# Patient Record
Sex: MALE | Race: WHITE | Employment: PART TIME | URBAN - NONMETROPOLITAN AREA
[De-identification: names, ages, dates, MRNs, and addresses within clinical notes are randomized per-mention and may not be internally consistent; named-entity substitution may affect disease eponyms.]

---

## 2017-08-31 ENCOUNTER — HOSPITAL ENCOUNTER (OUTPATIENT)
Dept: LAB | Age: 52
Discharge: HOME | End: 2017-08-31
Payer: MEDICAID

## 2017-08-31 ENCOUNTER — OFFICE VISIT (OUTPATIENT)
Dept: CARDIOLOGY | Age: 52
End: 2017-08-31
Payer: MEDICAID

## 2017-08-31 VITALS
WEIGHT: 193.6 LBS | DIASTOLIC BLOOD PRESSURE: 64 MMHG | SYSTOLIC BLOOD PRESSURE: 106 MMHG | HEART RATE: 68 BPM | BODY MASS INDEX: 25.66 KG/M2 | HEIGHT: 73 IN

## 2017-08-31 DIAGNOSIS — R42 DIZZINESS: ICD-10-CM

## 2017-08-31 DIAGNOSIS — R42 DIZZINESS: Primary | ICD-10-CM

## 2017-08-31 LAB
ANION GAP SERPL CALCULATED.3IONS-SCNC: 12 MMOL/L (ref 9–17)
BUN BLDV-MCNC: 40 MG/DL (ref 6–20)
BUN/CREAT BLD: 14 (ref 9–20)
CALCIUM SERPL-MCNC: 10.4 MG/DL (ref 8.6–10.4)
CHLORIDE BLD-SCNC: 104 MMOL/L (ref 98–107)
CO2: 25 MMOL/L (ref 20–31)
CREAT SERPL-MCNC: 2.92 MG/DL (ref 0.7–1.2)
GFR AFRICAN AMERICAN: 28 ML/MIN
GFR NON-AFRICAN AMERICAN: 23 ML/MIN
GFR SERPL CREATININE-BSD FRML MDRD: ABNORMAL ML/MIN/{1.73_M2}
GFR SERPL CREATININE-BSD FRML MDRD: ABNORMAL ML/MIN/{1.73_M2}
GLUCOSE BLD-MCNC: 102 MG/DL (ref 70–99)
HCT VFR BLD CALC: 39.5 % (ref 41–53)
HEMOGLOBIN: 12.9 G/DL (ref 13.5–17.5)
MCH RBC QN AUTO: 30 PG (ref 26–34)
MCHC RBC AUTO-ENTMCNC: 32.8 G/DL (ref 31–37)
MCV RBC AUTO: 91.5 FL (ref 80–100)
PDW BLD-RTO: 13.4 % (ref 11–14.5)
PLATELET # BLD: 215 K/UL (ref 140–450)
PMV BLD AUTO: 10.5 FL (ref 6–12)
POTASSIUM SERPL-SCNC: 4.7 MMOL/L (ref 3.7–5.3)
RBC # BLD: 4.32 M/UL (ref 4.5–5.9)
SODIUM BLD-SCNC: 141 MMOL/L (ref 135–144)
TSH SERPL DL<=0.05 MIU/L-ACNC: 3.45 MIU/L (ref 0.3–5)
WBC # BLD: 6.8 K/UL (ref 3.5–11)

## 2017-08-31 PROCEDURE — 99204 OFFICE O/P NEW MOD 45 MIN: CPT | Performed by: INTERNAL MEDICINE

## 2017-08-31 PROCEDURE — 85027 COMPLETE CBC AUTOMATED: CPT

## 2017-08-31 PROCEDURE — 36415 COLL VENOUS BLD VENIPUNCTURE: CPT

## 2017-08-31 PROCEDURE — 80048 BASIC METABOLIC PNL TOTAL CA: CPT

## 2017-08-31 PROCEDURE — 93000 ELECTROCARDIOGRAM COMPLETE: CPT | Performed by: INTERNAL MEDICINE

## 2017-08-31 PROCEDURE — 84443 ASSAY THYROID STIM HORMONE: CPT

## 2017-08-31 RX ORDER — MIDODRINE HYDROCHLORIDE 5 MG/1
5 TABLET ORAL 3 TIMES DAILY
Qty: 90 TABLET | Refills: 3 | Status: SHIPPED | OUTPATIENT
Start: 2017-08-31 | End: 2017-10-13 | Stop reason: SDUPTHER

## 2017-08-31 ASSESSMENT — ENCOUNTER SYMPTOMS
NAUSEA: 0
SHORTNESS OF BREATH: 0
ABDOMINAL PAIN: 0
VOMITING: 0
CHEST TIGHTNESS: 0

## 2017-09-18 ENCOUNTER — TELEPHONE (OUTPATIENT)
Dept: CARDIOLOGY | Age: 52
End: 2017-09-18

## 2017-09-18 ENCOUNTER — HOSPITAL ENCOUNTER (OUTPATIENT)
Dept: NON INVASIVE DIAGNOSTICS | Age: 52
Discharge: HOME | End: 2017-09-18
Payer: MEDICAID

## 2017-09-18 DIAGNOSIS — R42 DIZZINESS: ICD-10-CM

## 2017-09-18 PROCEDURE — 93306 TTE W/DOPPLER COMPLETE: CPT

## 2017-10-13 ENCOUNTER — OFFICE VISIT (OUTPATIENT)
Dept: CARDIOLOGY | Age: 52
End: 2017-10-13
Payer: MEDICAID

## 2017-10-13 VITALS
WEIGHT: 196 LBS | DIASTOLIC BLOOD PRESSURE: 50 MMHG | BODY MASS INDEX: 26.55 KG/M2 | HEART RATE: 96 BPM | HEIGHT: 72 IN | SYSTOLIC BLOOD PRESSURE: 84 MMHG

## 2017-10-13 DIAGNOSIS — N28.9 RENAL DISEASE: ICD-10-CM

## 2017-10-13 DIAGNOSIS — I95.1 ORTHOSTATIC HYPOTENSION: Primary | ICD-10-CM

## 2017-10-13 DIAGNOSIS — R42 DIZZINESS: ICD-10-CM

## 2017-10-13 PROCEDURE — 99213 OFFICE O/P EST LOW 20 MIN: CPT | Performed by: INTERNAL MEDICINE

## 2017-10-13 RX ORDER — MIDODRINE HYDROCHLORIDE 10 MG/1
5 TABLET ORAL 3 TIMES DAILY
Qty: 180 TABLET | Refills: 3 | Status: SHIPPED | OUTPATIENT
Start: 2017-10-13 | End: 2017-11-03 | Stop reason: SDUPTHER

## 2017-10-13 NOTE — PROGRESS NOTES
[address] [address]  Dept: [phone]  Loc: [phone]    Subjective: The patient is a 54y.o. year old, , male is in the office for a follow up visit. Pt denies any dizziness , weakness , his BP on lower side . Patient is doing quit well from cardiac stand point. He gabby any chest pain or discomfort. No orthopnea or PND. Gabby any palpitation, dizziness or syncope. He is completely asymptomatic from cardiac stand point. Past Medical History:   has a past medical history of Hyperthyroidism. Past Surgical History:   has a past surgical history that includes Throat surgery (9/17/1984) and Upper gastrointestinal endoscopy (4-2-15). Home Medications:  Prior to Admission medications    Medication Sig Start Date End Date Taking? Authorizing Provider   midodrine (PROAMATINE) 10 MG tablet Take 0.5 tablets by mouth 3 times daily 10/13/17  Yes Lilian Hardy MD   omeprazole (PRILOSEC) 20 MG capsule Take 1 capsule by mouth daily. 3/13/15  Yes Racquel Kingston MD   acetaminophen-codeine (TYLENOL #3) 300-30 MG per tablet Take 1 tablet by mouth every 6 hours as needed for Pain. Yes Historical Provider, MD   benztropine (COGENTIN) 2 MG tablet Take 2 mg by mouth 3 times daily. Yes Historical Provider, MD   doxepin (SINEQUAN) 25 MG capsule Take 25 mg by mouth nightly. Yes Historical Provider, MD   fenofibrate (TRICOR) 145 MG tablet Take 160 mg by mouth daily    Yes Historical Provider, MD   pravastatin (PRAVACHOL) 40 MG tablet Take 40 mg by mouth daily. Yes Historical Provider, MD   risperiDONE (RISPERDAL) 4 MG tablet Take 4 mg by mouth daily. Yes Historical Provider, MD       Allergies:  Pcn [penicillins]    Social History:   reports that he has never smoked. He has never used smokeless tobacco. He reports that he does not drink alcohol or use drugs. Review of Systems:  · Constitutional: there has been no unanticipated weight loss. There's been No change in energy level, No change in activity level. · Eyes: No visual changes or diplopia. No scleral icterus. · ENT: No Headaches, hearing loss or vertigo. No mouth sores or sore throat. · Cardiovascular: As above. · Respiratory: No SOB, cough or hemoptysis. · Gastrointestinal: No abdominal pain, appetite loss, blood in stools. No change in bowel or bladder habits. · Genitourinary: No dysuria, trouble voiding, or hematuria. · Musculoskeletal:  No gait disturbance, No weakness or joint complaints. · Integumentary: No rash or pruritis. · Psychiatric: No anxiety, or depression. · Hematologic/Lymphatic: No abnormal bruising or bleeding, blood clots or swollen lymph nodes. · Allergic/Immunologic: No nasal congestion or hives. Physical Exam:  BP (!) 84/50   Pulse 96   Ht 6' (1.829 m)   Wt 196 lb (88.9 kg)   BMI 26.58 kg/m²     Constitutional and General Appearance: alert, cooperative, no distress and appears stated age  [de-identified]: PERRL, no cervical lymphadenopathy. No masses palpable. Normal oral mucosa  Respiratory:  · Normal excursion and expansion without use of accessory muscles  · Resp Auscultation: Good respiratory effort. No for increased work of breathing. On auscultation: clear to auscultation bilaterally  Cardiovascular:  · The apical impulse is not displaced  · Heart tones are crisp and normal. regular S1 and S2.  · Jugular venous pulsation Normal  · The carotid upstroke is normal in amplitude and contour without delay or bruit  · Peripheral pulses are symmetrical and full   Abdomen:   · No masses or tenderness  · Bowel sounds present  Extremities:  ·  No Cyanosis or Clubbing  ·  Lower extremity edema: No  ·  Skin: Warm and dry    Cardiac Data:  EKG:     Labs:     CBC: No results for input(s): WBC, HGB, HCT, PLT in the last 72 hours. BMP: No results for input(s): NA, K, CO2, BUN, CREATININE, LABGLOM, GLUCOSE in the last 72 hours.   PT/INR: No results for input(s): PROTIME, INR in the last 72 hours. FASTING LIPID PANEL:No results found for: CHOL, HDL, LDLDIRECT, LDLCHOLESTEROL, TRIG, LABVLDL, CHOLHDLRATIO  LIVER PROFILE:No results for input(s): AST, ALT, LABALBU in the last 72 hours. IMPRESSION:    Hypotension   Renal disease     RECOMMENDATIONS:  HYPOTENSION , STILL LOW ON 5 MG MIDODRINE , NO SYMPTOMS     WILL INCREASE 10 MG TID     PT ADVISED WATCH WELL HYDRATION , AVOID SUDDEN CHANGE IN POSTURE     ECHO WAS NORMAL     DISCUSSED IN DETAILS ABOUT RISK MODIFICATION    RETURN VISIT IN 3 MONTHS, IF ANY SYMPTOM CHANGE PATIENT ADVISED TO GO TO THE EMERGENCY ROOM.           Jorgito Brooke [address] [facility]           [phone]

## 2017-11-03 ENCOUNTER — TELEPHONE (OUTPATIENT)
Dept: PAIN MANAGEMENT | Age: 52
End: 2017-11-03

## 2017-11-03 DIAGNOSIS — R42 DIZZINESS: ICD-10-CM

## 2017-11-03 RX ORDER — MIDODRINE HYDROCHLORIDE 10 MG/1
10 TABLET ORAL 3 TIMES DAILY
Qty: 270 TABLET | Refills: 3 | Status: SHIPPED | OUTPATIENT
Start: 2017-11-03 | End: 2018-10-22 | Stop reason: SDUPTHER

## 2017-11-03 NOTE — TELEPHONE ENCOUNTER
Dr. Usha Quezada,  Dr. Lizett Churchill note from Oct 2017 does say Midodrine 10 mg tid. Please sign new Rx. I spoke to leyda. He is aware that we will be sending a new Rx to [facility].

## 2018-01-12 ENCOUNTER — OFFICE VISIT (OUTPATIENT)
Dept: CARDIOLOGY | Age: 53
End: 2018-01-12
Payer: MEDICAID

## 2018-01-12 VITALS
HEART RATE: 62 BPM | RESPIRATION RATE: 14 BRPM | SYSTOLIC BLOOD PRESSURE: 90 MMHG | DIASTOLIC BLOOD PRESSURE: 70 MMHG | WEIGHT: 194 LBS | BODY MASS INDEX: 26.28 KG/M2 | HEIGHT: 72 IN

## 2018-01-12 DIAGNOSIS — R42 DIZZINESS: Primary | ICD-10-CM

## 2018-01-12 PROCEDURE — 99213 OFFICE O/P EST LOW 20 MIN: CPT | Performed by: INTERNAL MEDICINE

## 2018-01-12 PROCEDURE — 93000 ELECTROCARDIOGRAM COMPLETE: CPT | Performed by: INTERNAL MEDICINE

## 2018-01-12 RX ORDER — MULTIVIT-MIN/IRON/FOLIC ACID/K 18-600-40
1 CAPSULE ORAL DAILY
COMMUNITY

## 2018-01-12 NOTE — PROGRESS NOTES
LIPID PANEL:No results found for: CHOL, HDL, LDLDIRECT, LDLCHOLESTEROL, TRIG, LABVLDL, CHOLHDLRATIO  LIVER PROFILE:No results for input(s): AST, ALT, LABALBU in the last 72 hours. IMPRESSION:    DIZZINESS   HYPOTENSION    RECOMMENDATIONS:  HYPOTENSION AND DIZZINESS , AFTER STARTING MIDODRINE MUCH IMPROVED , NO BLACK OUT OR FALL , SELDOM VERY LIGHT DIZZINESS . ADVISED LOT OF POWER RADE , FLUID , WELL HYDRATION   COMPRESSION STOCKING 20-30 MMHG   ANY CHANGE IN SYMPTOMS GO TO ER   DISCUSSED IN DETAILS ABOUT RISK MODIFICATION    RETURN VISIT IN 5 MONTHS, IF ANY SYMPTOM CHANGE PATIENT ADVISED TO GO TO THE EMERGENCY ROOM.           Jorgito Senior [address] [facility]           [phone]

## 2018-06-01 ENCOUNTER — OFFICE VISIT (OUTPATIENT)
Dept: CARDIOLOGY | Age: 53
End: 2018-06-01
Payer: MEDICAID

## 2018-06-01 VITALS
DIASTOLIC BLOOD PRESSURE: 60 MMHG | HEART RATE: 65 BPM | BODY MASS INDEX: 25.19 KG/M2 | WEIGHT: 186 LBS | HEIGHT: 72 IN | SYSTOLIC BLOOD PRESSURE: 100 MMHG

## 2018-06-01 DIAGNOSIS — I95.1 ORTHOSTATIC HYPOTENSION: ICD-10-CM

## 2018-06-01 DIAGNOSIS — R42 DIZZINESS: Primary | ICD-10-CM

## 2018-06-01 PROCEDURE — 99213 OFFICE O/P EST LOW 20 MIN: CPT | Performed by: INTERNAL MEDICINE

## 2018-06-01 PROCEDURE — 93000 ELECTROCARDIOGRAM COMPLETE: CPT | Performed by: INTERNAL MEDICINE

## 2018-06-01 ASSESSMENT — ENCOUNTER SYMPTOMS
ABDOMINAL DISTENTION: 0
APNEA: 0
ABDOMINAL PAIN: 0
CHEST TIGHTNESS: 0
SHORTNESS OF BREATH: 1

## 2018-09-21 ENCOUNTER — OFFICE VISIT (OUTPATIENT)
Dept: CARDIOLOGY | Age: 53
End: 2018-09-21
Payer: MEDICAID

## 2018-09-21 VITALS
WEIGHT: 190 LBS | HEART RATE: 72 BPM | DIASTOLIC BLOOD PRESSURE: 70 MMHG | BODY MASS INDEX: 25.73 KG/M2 | HEIGHT: 72 IN | SYSTOLIC BLOOD PRESSURE: 110 MMHG

## 2018-09-21 DIAGNOSIS — I95.1 ORTHOSTATIC HYPOTENSION: Primary | ICD-10-CM

## 2018-09-21 PROCEDURE — 99213 OFFICE O/P EST LOW 20 MIN: CPT | Performed by: INTERNAL MEDICINE

## 2018-09-21 RX ORDER — FLUDROCORTISONE ACETATE 0.1 MG/1
1 TABLET ORAL DAILY
COMMUNITY
End: 2020-07-31

## 2018-09-21 NOTE — PROGRESS NOTES
never used smokeless tobacco. He reports that he does not drink alcohol or use drugs. Review of Systems:  · Constitutional: there has been no unanticipated weight loss. There's been No change in energy level, No change in activity level. · Eyes: No visual changes or diplopia. No scleral icterus. · ENT: No Headaches, hearing loss or vertigo. No mouth sores or sore throat. · Cardiovascular: As above. · Respiratory: No SOB, cough or hemoptysis. · Gastrointestinal: No abdominal pain, appetite loss, blood in stools. No change in bowel or bladder habits. · Genitourinary: No dysuria, trouble voiding, or hematuria. · Musculoskeletal:  No gait disturbance, No weakness or joint complaints. · Integumentary: No rash or pruritis. · Psychiatric: No anxiety, or depression. · Hematologic/Lymphatic: No abnormal bruising or bleeding, blood clots or swollen lymph nodes. · Allergic/Immunologic: No nasal congestion or hives. Physical Exam:  /70   Pulse 72   Ht 6' (1.829 m)   Wt 190 lb (86.2 kg)   BMI 25.77 kg/m²     Constitutional and General Appearance: alert, cooperative, no distress and appears stated age  [de-identified]: PERRL, no cervical lymphadenopathy. No masses palpable. Normal oral mucosa  Respiratory:  · Normal excursion and expansion without use of accessory muscles  · Resp Auscultation: Good respiratory effort. No for increased work of breathing.  On auscultation: clear to auscultation bilaterally  Cardiovascular:  · The apical impulse is not displaced  · Heart tones are crisp and normal. regular S1 and S2.  · Jugular venous pulsation Normal  · The carotid upstroke is normal in amplitude and contour without delay or bruit  · Peripheral pulses are symmetrical and full   Abdomen:   · No masses or tenderness  · Bowel sounds present  Extremities:  ·  No Cyanosis or Clubbing  ·  Lower extremity edema: No  ·  Skin: Warm and dry    Cardiac Data:  EKG:     Labs:     CBC: No results for input(s): WBC, HGB, HCT, PLT in the last 72 hours. BMP: No results for input(s): NA, K, CO2, BUN, CREATININE, LABGLOM, GLUCOSE in the last 72 hours. PT/INR: No results for input(s): PROTIME, INR in the last 72 hours. FASTING LIPID PANEL:No results found for: CHOL, HDL, LDLDIRECT, LDLCHOLESTEROL, TRIG, LABVLDL, CHOLHDLRATIO  LIVER PROFILE:No results for input(s): AST, ALT, LABALBU in the last 72 hours. IMPRESSION:    There is no problem list on file for this patient. RECOMMENDATIONS    ORTHOSTATIC HYPOTENSION DOING WELL ON MIDODRINE AND FLORINEF   ADVISE TO WEAR COMPRESSION STOCKING  REGULARLY   RENAL INSUFICENCY  FOLLOWED BY NEPHROLOGY     DISCUSSED IN DETAILS ABOUT RISK MODIFICATION    RETURN VISIT IN 6 MONTHS, IF ANY SYMPTOM CHANGE PATIENT ADVISED TO GO TO THE EMERGENCY ROOM.           Verna Lynn [address] [facility]           [phone]

## 2018-09-21 NOTE — PATIENT INSTRUCTIONS
[employer] Information    Go to [email or web address] or open link in email to log in.     Username: [email or web address]    Password: Arianna Kennedy

## 2018-10-22 DIAGNOSIS — R42 DIZZINESS: ICD-10-CM

## 2018-10-23 RX ORDER — MIDODRINE HYDROCHLORIDE 10 MG/1
10 TABLET ORAL 3 TIMES DAILY
Qty: 270 TABLET | Refills: 3 | Status: SHIPPED | OUTPATIENT
Start: 2018-10-23 | End: 2019-10-29 | Stop reason: SDUPTHER

## 2018-10-24 ENCOUNTER — TELEPHONE (OUTPATIENT)
Dept: CARDIOLOGY | Age: 53
End: 2018-10-24

## 2018-10-24 NOTE — TELEPHONE ENCOUNTER
Would like to know if there is a substitute for florinef. Patient has 3 weeks left of florinef but was told by pharmacy that med was being discontinued.

## 2019-03-15 ENCOUNTER — OFFICE VISIT (OUTPATIENT)
Dept: CARDIOLOGY | Age: 54
End: 2019-03-15
Payer: MEDICAID

## 2019-03-15 VITALS
SYSTOLIC BLOOD PRESSURE: 100 MMHG | DIASTOLIC BLOOD PRESSURE: 70 MMHG | HEART RATE: 60 BPM | WEIGHT: 187 LBS | BODY MASS INDEX: 25.33 KG/M2 | HEIGHT: 72 IN

## 2019-03-15 DIAGNOSIS — R06.02 SOB (SHORTNESS OF BREATH): ICD-10-CM

## 2019-03-15 DIAGNOSIS — I95.1 ORTHOSTATIC HYPOTENSION: ICD-10-CM

## 2019-03-15 DIAGNOSIS — R42 DIZZINESS: Primary | ICD-10-CM

## 2019-03-15 DIAGNOSIS — R07.9 CHEST PAIN, UNSPECIFIED TYPE: ICD-10-CM

## 2019-03-15 PROCEDURE — G8419 CALC BMI OUT NRM PARAM NOF/U: HCPCS | Performed by: INTERNAL MEDICINE

## 2019-03-15 PROCEDURE — 3017F COLORECTAL CA SCREEN DOC REV: CPT | Performed by: INTERNAL MEDICINE

## 2019-03-15 PROCEDURE — G8484 FLU IMMUNIZE NO ADMIN: HCPCS | Performed by: INTERNAL MEDICINE

## 2019-03-15 PROCEDURE — 93000 ELECTROCARDIOGRAM COMPLETE: CPT | Performed by: INTERNAL MEDICINE

## 2019-03-15 PROCEDURE — 1036F TOBACCO NON-USER: CPT | Performed by: INTERNAL MEDICINE

## 2019-03-15 PROCEDURE — G8427 DOCREV CUR MEDS BY ELIG CLIN: HCPCS | Performed by: INTERNAL MEDICINE

## 2019-03-15 PROCEDURE — 99214 OFFICE O/P EST MOD 30 MIN: CPT | Performed by: INTERNAL MEDICINE

## 2019-03-15 ASSESSMENT — ENCOUNTER SYMPTOMS
SHORTNESS OF BREATH: 1
ABDOMINAL PAIN: 0
NAUSEA: 0
CHEST TIGHTNESS: 0
VOMITING: 0

## 2019-06-11 ENCOUNTER — TELEPHONE (OUTPATIENT)
Dept: CARDIOLOGY | Age: 54
End: 2019-06-11

## 2019-06-13 NOTE — TELEPHONE ENCOUNTER
Left message for pt to call to schedule stress. [address] for prior auth for stress test.   Per Jennie Canavan., stress is APPROVED from today 6/13/19 and expires on 7/28/19. Auth # [id number].

## 2019-06-13 NOTE — TELEPHONE ENCOUNTER
Scheduled stress for 7/9/19 at 1:00pm. Notified pt of instructions and date and time and pt states understanding and accepts.

## 2019-07-09 ENCOUNTER — HOSPITAL ENCOUNTER (OUTPATIENT)
Dept: NON INVASIVE DIAGNOSTICS | Age: 54
Discharge: HOME | End: 2019-07-09
Payer: MEDICAID

## 2019-07-09 ENCOUNTER — HOSPITAL ENCOUNTER (OUTPATIENT)
Dept: NUCLEAR MEDICINE | Age: 54
End: 2019-07-09
Payer: MEDICAID

## 2019-07-09 ENCOUNTER — HOSPITAL ENCOUNTER (OUTPATIENT)
Dept: NUCLEAR MEDICINE | Age: 54
Discharge: HOME | End: 2019-07-11
Payer: MEDICAID

## 2019-07-09 DIAGNOSIS — I95.1 ORTHOSTATIC HYPOTENSION: ICD-10-CM

## 2019-07-09 DIAGNOSIS — R06.02 SOB (SHORTNESS OF BREATH): ICD-10-CM

## 2019-07-09 DIAGNOSIS — R42 DIZZINESS: ICD-10-CM

## 2019-07-09 DIAGNOSIS — R07.9 CHEST PAIN, UNSPECIFIED TYPE: ICD-10-CM

## 2019-07-09 PROCEDURE — 93016 CV STRESS TEST SUPVJ ONLY: CPT | Performed by: INTERNAL MEDICINE

## 2019-07-09 PROCEDURE — 93018 CV STRESS TEST I&R ONLY: CPT | Performed by: INTERNAL MEDICINE

## 2019-07-09 PROCEDURE — 93017 CV STRESS TEST TRACING ONLY: CPT

## 2019-07-09 PROCEDURE — 78452 HT MUSCLE IMAGE SPECT MULT: CPT

## 2019-07-09 PROCEDURE — A9500 TC99M SESTAMIBI: HCPCS | Performed by: INTERNAL MEDICINE

## 2019-07-09 PROCEDURE — 6360000002 HC RX W HCPCS: Performed by: INTERNAL MEDICINE

## 2019-07-09 PROCEDURE — 3430000000 HC RX DIAGNOSTIC RADIOPHARMACEUTICAL: Performed by: INTERNAL MEDICINE

## 2019-07-09 RX ADMIN — REGADENOSON 0.4 MG: 0.08 INJECTION, SOLUTION INTRAVENOUS at 14:17

## 2019-07-09 RX ADMIN — TETRAKIS(2-METHOXYISOBUTYLISOCYANIDE)COPPER(I) TETRAFLUOROBORATE 30 MILLICURIE: 1 INJECTION, POWDER, LYOPHILIZED, FOR SOLUTION INTRAVENOUS at 15:28

## 2019-07-09 RX ADMIN — TETRAKIS(2-METHOXYISOBUTYLISOCYANIDE)COPPER(I) TETRAFLUOROBORATE 10 MILLICURIE: 1 INJECTION, POWDER, LYOPHILIZED, FOR SOLUTION INTRAVENOUS at 15:28

## 2019-07-09 NOTE — PROGRESS NOTES
Patient Name:  Nate Perry MRN:  [id number]   :  1965  Age:  48 y.o. Sex: male   Ordering Provider: Cathy Lovell MD  Referring Provider: ELVIN Rios  Primary Care Provider:  ELVIN Rios     Indications: chest pain, dizziness, HTN, and SOB     Medications:     Current Outpatient Medications:     midodrine (PROAMATINE) 10 MG tablet, TAKE 1 TABLET BY MOUTH 3 TIMES DAILY, Disp: 270 tablet, Rfl: 3    fludrocortisone (FLORINEF) 0.1 MG tablet, Take 1 mg by mouth daily , Disp: , Rfl:     Cholecalciferol (VITAMIN D) 2000 units CAPS capsule, Take 1 capsule by mouth daily, Disp: , Rfl:     benztropine (COGENTIN) 2 MG tablet, Take 2 mg by mouth 3 times daily. , Disp: , Rfl:     fenofibrate (TRICOR) 145 MG tablet, Take 145 mg by mouth daily , Disp: , Rfl:     pravastatin (PRAVACHOL) 40 MG tablet, Take 40 mg by mouth daily. , Disp: , Rfl:     risperiDONE (RISPERDAL) 4 MG tablet, Take 4 mg by mouth daily. , Disp: , Rfl:     Current Facility-Administered Medications:     regadenoson (LEXISCAN) injection 0.4 mg, 0.4 mg, Intravenous, Once, Georgi Mead MD      ----------------------------------------------------------------------------------------------------------                Lexiscan 0.4 mg injected over 10 seconds. IV Cardiolite injected 20 seconds post Lexiscan injection.      Heart Rate:  55  Resting Blood Pressure:  119/72   ----------------------------------------------------------------------------------------------------------     HR BP   1 min 99 107/58   2 min     3 min 85 109/56   4 min     5 min 78 114/56   6 min     7 min 78 122/60   8 min     9 min 74 117/59   10 min       Symptoms:  Chest Pain:  No      Nausea:  No     Headache:  No    Shortness of Breath:  Yes     Other:  No       Electronically signed by Lisa Major RCP on 19 at 2:05 PM    ----------------------------------------------------------------------------------------------------------  Resting EKG:  NSR , LVH

## 2019-09-20 ENCOUNTER — OFFICE VISIT (OUTPATIENT)
Dept: CARDIOLOGY | Age: 54
End: 2019-09-20
Payer: MEDICAID

## 2019-09-20 VITALS
HEART RATE: 71 BPM | SYSTOLIC BLOOD PRESSURE: 96 MMHG | WEIGHT: 180.4 LBS | DIASTOLIC BLOOD PRESSURE: 70 MMHG | BODY MASS INDEX: 24.47 KG/M2

## 2019-09-20 DIAGNOSIS — I95.1 ORTHOSTATIC HYPOTENSION: Primary | ICD-10-CM

## 2019-09-20 PROCEDURE — 93000 ELECTROCARDIOGRAM COMPLETE: CPT | Performed by: INTERNAL MEDICINE

## 2019-09-20 PROCEDURE — 99214 OFFICE O/P EST MOD 30 MIN: CPT | Performed by: INTERNAL MEDICINE

## 2019-09-20 PROCEDURE — G8427 DOCREV CUR MEDS BY ELIG CLIN: HCPCS | Performed by: INTERNAL MEDICINE

## 2019-09-20 PROCEDURE — 3017F COLORECTAL CA SCREEN DOC REV: CPT | Performed by: INTERNAL MEDICINE

## 2019-09-20 PROCEDURE — 1036F TOBACCO NON-USER: CPT | Performed by: INTERNAL MEDICINE

## 2019-09-20 PROCEDURE — G8420 CALC BMI NORM PARAMETERS: HCPCS | Performed by: INTERNAL MEDICINE

## 2019-09-20 ASSESSMENT — ENCOUNTER SYMPTOMS
CHEST TIGHTNESS: 0
NAUSEA: 0
BACK PAIN: 0
SHORTNESS OF BREATH: 0
VOMITING: 0
ABDOMINAL PAIN: 0

## 2019-10-29 DIAGNOSIS — R42 DIZZINESS: ICD-10-CM

## 2019-10-29 RX ORDER — MIDODRINE HYDROCHLORIDE 10 MG/1
10 TABLET ORAL 3 TIMES DAILY
Qty: 270 TABLET | Refills: 3 | Status: SHIPPED | OUTPATIENT
Start: 2019-10-29 | End: 2020-11-16

## 2020-07-31 ENCOUNTER — OFFICE VISIT (OUTPATIENT)
Dept: CARDIOLOGY | Age: 55
End: 2020-07-31
Payer: MEDICAID

## 2020-07-31 VITALS
BODY MASS INDEX: 25.87 KG/M2 | WEIGHT: 191 LBS | HEART RATE: 83 BPM | HEIGHT: 72 IN | SYSTOLIC BLOOD PRESSURE: 100 MMHG | DIASTOLIC BLOOD PRESSURE: 62 MMHG

## 2020-07-31 PROCEDURE — 3017F COLORECTAL CA SCREEN DOC REV: CPT | Performed by: INTERNAL MEDICINE

## 2020-07-31 PROCEDURE — 93010 ELECTROCARDIOGRAM REPORT: CPT | Performed by: INTERNAL MEDICINE

## 2020-07-31 PROCEDURE — 99214 OFFICE O/P EST MOD 30 MIN: CPT | Performed by: INTERNAL MEDICINE

## 2020-07-31 PROCEDURE — 1036F TOBACCO NON-USER: CPT | Performed by: INTERNAL MEDICINE

## 2020-07-31 PROCEDURE — G8427 DOCREV CUR MEDS BY ELIG CLIN: HCPCS | Performed by: INTERNAL MEDICINE

## 2020-07-31 PROCEDURE — G8419 CALC BMI OUT NRM PARAM NOF/U: HCPCS | Performed by: INTERNAL MEDICINE

## 2020-07-31 PROCEDURE — 99213 OFFICE O/P EST LOW 20 MIN: CPT

## 2020-07-31 PROCEDURE — 93005 ELECTROCARDIOGRAM TRACING: CPT | Performed by: INTERNAL MEDICINE

## 2020-07-31 NOTE — PROGRESS NOTES
Today's Date: 7/31/2020  Patient Name: Zack Lynn  Patient's age: 48 y. o., 1965          CC: the patient is a 48 y.o.  male is in the office for dizziness. Gets dizzy upon standing but no fall/syncope. Patient has been doing well cardiac wise, no new cardiac complaints. He denies angina, PND/Orthopnea. Past Medical History:   has a past medical history of Hyperthyroidism. Past Surgical History:   has a past surgical history that includes Throat surgery (9/17/1984) and Upper gastrointestinal endoscopy (4-2-15). Home Medications:    Prior to Admission medications    Medication Sig Start Date End Date Taking? Authorizing Provider   midodrine (PROAMATINE) 10 MG tablet Take 1 tablet by mouth 3 times daily 10/29/19  Yes Bindu Gamino MD   Cholecalciferol (VITAMIN D) 2000 units CAPS capsule Take 1 capsule by mouth daily   Yes Historical Provider, MD   benztropine (COGENTIN) 2 MG tablet Take 2 mg by mouth 3 times daily. Yes Historical Provider, MD   fenofibrate (TRICOR) 145 MG tablet Take 145 mg by mouth daily    Yes Historical Provider, MD   pravastatin (PRAVACHOL) 40 MG tablet Take 40 mg by mouth daily. Yes Historical Provider, MD   risperiDONE (RISPERDAL) 4 MG tablet Take 4 mg by mouth daily. Yes Historical Provider, MD       Allergies:  Ibuprofen and Pcn [penicillins]    Social History:   reports that he has never smoked. He has never used smokeless tobacco. He reports that he does not drink alcohol or use drugs. Family History:    Family History   Problem Relation Age of Onset    High Blood Pressure Mother     Diabetes Maternal Aunt     Diabetes Maternal Grandmother     Cancer Maternal Grandfather         lung cancer       REVIEW OF SYSTEMS:  CONSTITUTIONAL:NEGATIVE  HEENT:NEG  Cardiovascular: No chest pain, No dyspnea on exertion, No palpitations.  Lower extremity edema: No  RESPIRATORY: neg  GASTROINTESTINAL:  negative  GENITOURINARY:

## 2020-09-03 ENCOUNTER — OFFICE VISIT (OUTPATIENT)
Dept: PODIATRY | Age: 55
End: 2020-09-03
Payer: MEDICAID

## 2020-09-03 VITALS
DIASTOLIC BLOOD PRESSURE: 68 MMHG | HEIGHT: 72 IN | BODY MASS INDEX: 26.09 KG/M2 | WEIGHT: 192.6 LBS | HEART RATE: 60 BPM | SYSTOLIC BLOOD PRESSURE: 118 MMHG

## 2020-09-03 PROCEDURE — 99203 OFFICE O/P NEW LOW 30 MIN: CPT | Performed by: PODIATRIST

## 2020-09-03 PROCEDURE — 99214 OFFICE O/P EST MOD 30 MIN: CPT

## 2020-09-03 PROCEDURE — G8419 CALC BMI OUT NRM PARAM NOF/U: HCPCS | Performed by: PODIATRIST

## 2020-09-03 PROCEDURE — 3017F COLORECTAL CA SCREEN DOC REV: CPT | Performed by: PODIATRIST

## 2020-09-03 PROCEDURE — 1036F TOBACCO NON-USER: CPT | Performed by: PODIATRIST

## 2020-09-03 PROCEDURE — G8427 DOCREV CUR MEDS BY ELIG CLIN: HCPCS | Performed by: PODIATRIST

## 2020-09-03 RX ORDER — METHYLPREDNISOLONE 4 MG/1
TABLET ORAL
Qty: 1 KIT | Refills: 0 | Status: SHIPPED | OUTPATIENT
Start: 2020-09-03 | End: 2020-09-24 | Stop reason: ALTCHOICE

## 2020-09-03 NOTE — PROGRESS NOTES
Subjective:  Waldo Bravo is a 48 y.o. male who presents to the office today complaining of pain on the Bilateral foot. Symptoms began 1 month(s) ago. Getting worse, off and on for multiple years though. History of injury: no.  Patient relates pain is Present. Pain is rated 4 out of 10 and is described as waxing and waning. Treatments prior to today's visit include: tylenol. Currently denies F/C/N/V. Allergies   Allergen Reactions    Ibuprofen      Kidney disease    Pcn [Penicillins] Rash       Past Medical History:   Diagnosis Date    Hyperthyroidism     Stage 3 chronic kidney disease ([facility] [address]        Prior to Admission medications    Medication Sig Start Date End Date Taking? Authorizing Provider   midodrine (PROAMATINE) 10 MG tablet Take 1 tablet by mouth 3 times daily 10/29/19  Yes Olimpia Reed MD   Cholecalciferol (VITAMIN D) 2000 units CAPS capsule Take 1 capsule by mouth daily   Yes Historical Provider, MD   benztropine (COGENTIN) 2 MG tablet Take 2 mg by mouth 3 times daily. Yes Historical Provider, MD   fenofibrate (TRICOR) 145 MG tablet Take 145 mg by mouth daily    Yes Historical Provider, MD   pravastatin (PRAVACHOL) 40 MG tablet Take 40 mg by mouth daily. Yes Historical Provider, MD   risperiDONE (RISPERDAL) 4 MG tablet Take 4 mg by mouth daily.    Yes Historical Provider, MD       Past Surgical History:   Procedure Laterality Date    COLONOSCOPY      THROAT SURGERY  9/17/1984    Dr. Lisa Falk ENT double set of vocal cords     UPPER GASTROINTESTINAL ENDOSCOPY  4-2-15    mild gastritis, esophagitis       Family History   Problem Relation Age of Onset    High Blood Pressure Mother     Diabetes Maternal Aunt     Diabetes Maternal Grandmother     Cancer Maternal Grandfather         lung cancer       Social History     Tobacco Use    Smoking status: Never Smoker    Smokeless tobacco: Never Used   Substance Use Topics    Alcohol use: No       ROS: All 14 ROS systems reviewed and pertinent positives noted above, all others negative. Lower Extremity Physical Examination:     Vitals:   Vitals:    09/03/20 1025   BP: 118/68   Pulse: 60     General: AAO x 3 in NAD. Vascular: DP and PT pulses palpable 2/4, bilateral.  CFT <3 seconds, bilateral.  Hair growth present to the level of the digits, bilateral.  Edema absent, bilateral.  Varicosities absent, bilateral. Erythema absent, bilateral. Distal Rubor absent bilateral.  Temperature within normal limits bilateral. Hyperpigmentation absent bilateral. No atrophic skin. Neurological: Sensation intact to light touch to level of digits, bilateral.  Protective sensation intact 10/10 sites via 5.07/10g Deshler-Ranjan Monofilament, bilateral.  negative Tinel's, bilateral.  negative Valleix sign, bilateral.  Vibratory intact bilateral.  Reflexes Decreased bilateral.  Paresthesias negative. Dysthesias negative. Sharp/dull intact bilateral.    Musculoskeletal: Muscle strength 5/5, Bilateral.  Pain with strength testing is absent. Pain present upon palpation of distal achilles insertion, Bilateral.  within normal limits medial longitudinal arch, Bilateral.  Ankle ROM decreased,Bilateral.  1st MPJ ROM within normal limits, Bilateral.  Dorsally contracted digits present digits , Bilateral. Other foot deformities haglunds deformity b/l heel. Integument: Warm, dry, supple, bilateral.  Open lesion absent, bilateral.  Interdigital maceration absent to web spaces bilateral.  Nails within normal limits. Fissures absent, bilateral. Hyperkeratotic tissue is absent. Asessment: Patient is a 48 y.o. male with:    Diagnosis Orders   1. Achilles tendinitis of both lower extremities     2. Kelsy's deformity, bilateral     3. Acquired equinus deformity of foot, unspecified laterality         Plan: Patient examined and evaluated. Current condition and treatment options discussed in detail. X rays report reviewed with the pt in detail.   All questions answered. Images would not load so patient will be contacted we can see the actual images. Orders Placed This Encounter   Medications    Elastic Bandages & Supports (ANKLE SPLINT/NIGHT AIRFORM) MISC     Si Device by Does not apply route every evening Achilles tendinitis of both lower extremities  (primary encounter diagnosis)  Kelsy's deformity, bilateral  Equinus deformity    Dispense posterior night splint. Dispense:  1 each     Refill:  0    methylPREDNISolone (MEDROL DOSEPACK) 4 MG tablet     Sig: Take by mouth. Dispense:  1 kit     Refill:  0   Achilles tendonitis Treatment    1. Stretching - 3 times per day minimum for 5-10 minutes. Include stretching against a wall or counter with one foot back and heel flat,    Leaning into a step, or using a towel or belt. Be active and aggressive with your stretching for maximum benefit. 2.  Ice - 3 times per day. 3.  Anti-inflammatory - take OTC or Rx as ordered. 4.  Arch support and supportive shoe gear. Wear orthotics or prefabricated arch supports at ALL times in supportive shoegear. Do NOT go barefoot. Contact office with any questions/problems/concerns. RTC in 3week(s).

## 2020-09-24 ENCOUNTER — OFFICE VISIT (OUTPATIENT)
Dept: PODIATRY | Age: 55
End: 2020-09-24
Payer: MEDICAID

## 2020-09-24 VITALS
HEART RATE: 64 BPM | HEIGHT: 72 IN | BODY MASS INDEX: 25.87 KG/M2 | SYSTOLIC BLOOD PRESSURE: 128 MMHG | DIASTOLIC BLOOD PRESSURE: 74 MMHG | WEIGHT: 191 LBS

## 2020-09-24 PROCEDURE — 99213 OFFICE O/P EST LOW 20 MIN: CPT

## 2020-09-24 PROCEDURE — 1036F TOBACCO NON-USER: CPT | Performed by: PODIATRIST

## 2020-09-24 PROCEDURE — 3017F COLORECTAL CA SCREEN DOC REV: CPT | Performed by: PODIATRIST

## 2020-09-24 PROCEDURE — 99213 OFFICE O/P EST LOW 20 MIN: CPT | Performed by: PODIATRIST

## 2020-09-24 PROCEDURE — G8419 CALC BMI OUT NRM PARAM NOF/U: HCPCS | Performed by: PODIATRIST

## 2020-09-24 PROCEDURE — G8427 DOCREV CUR MEDS BY ELIG CLIN: HCPCS | Performed by: PODIATRIST

## 2020-09-24 NOTE — PROGRESS NOTES
Subjective:  Davian Giordano is a 48 y.o. male who presents to the office today complaining of pain on the back of heels. Symptoms similar. No benefit with Rx med. Patient relates pain is Present. Pain is rated 4 out of 10 and is described as comes and goes. Pt has used splint for few days only. Currently denies F/C/N/V. Allergies   Allergen Reactions    Ibuprofen      Kidney disease    Pcn [Penicillins] Rash       Past Medical History:   Diagnosis Date    Hyperthyroidism     Stage 3 chronic kidney disease ([facility] [address]        Prior to Admission medications    Medication Sig Start Date End Date Taking? Authorizing Provider   diclofenac sodium (VOLTAREN) 1 % GEL Apply 4 g topically 4 times daily 9/24/20 10/24/20 Yes Supa Garcia DPM   Elastic Bandages & Supports (ANKLE SPLINT/NIGHT AIRFORM) MISC 1 Device by Does not apply route every evening Achilles tendinitis of both lower extremities  (primary encounter diagnosis)  Kelsy's deformity, bilateral  Equinus deformity    Dispense posterior night splint. 9/3/20  Yes Supa Garcia DPM   midodrine (PROAMATINE) 10 MG tablet Take 1 tablet by mouth 3 times daily 10/29/19  Yes Enriqueta Mari MD   Cholecalciferol (VITAMIN D) 2000 units CAPS capsule Take 1 capsule by mouth daily   Yes Historical Provider, MD   benztropine (COGENTIN) 2 MG tablet Take 2 mg by mouth 3 times daily. Yes Historical Provider, MD   fenofibrate (TRICOR) 145 MG tablet Take 145 mg by mouth daily    Yes Historical Provider, MD   pravastatin (PRAVACHOL) 40 MG tablet Take 40 mg by mouth daily. Yes Historical Provider, MD   risperiDONE (RISPERDAL) 4 MG tablet Take 4 mg by mouth daily.    Yes Historical Provider, MD       Past Surgical History:   Procedure Laterality Date    COLONOSCOPY      THROAT SURGERY  9/17/1984    Dr. Jaciel Granados ENT double set of vocal cords     UPPER GASTROINTESTINAL ENDOSCOPY  4-2-15    mild gastritis, esophagitis       Family History   Problem Relation Age of Onset    High Blood Pressure Mother     Diabetes Maternal Aunt     Diabetes Maternal Grandmother     Cancer Maternal Grandfather         lung cancer       Social History     Tobacco Use    Smoking status: Never Smoker    Smokeless tobacco: Never Used   Substance Use Topics    Alcohol use: No       ROS: All 14 ROS systems reviewed and pertinent positives noted above, all others negative. Lower Extremity Physical Examination:     Vitals:   Vitals:    09/24/20 1508   BP: 128/74   Pulse: 64     General: AAO x 3 in NAD. Vascular: DP and PT pulses palpable 2/4, bilateral.  CFT <3 seconds, bilateral.  Hair growth present to the level of the digits, bilateral.  Edema absent, bilateral.  Varicosities absent, bilateral. Erythema absent, bilateral. Distal Rubor absent bilateral.  Temperature within normal limits bilateral. Hyperpigmentation absent bilateral. No atrophic skin. Neurological: Sensation intact to light touch to level of digits, bilateral.  Protective sensation intact 10/10 sites via 5.07/10g Fenton-Ranjan Monofilament, bilateral.  negative Tinel's, bilateral.  negative Valleix sign, bilateral.  Vibratory intact bilateral.  Reflexes Decreased bilateral.  Paresthesias negative. Dysthesias negative. Sharp/dull intact bilateral.    Musculoskeletal: Muscle strength 5/5, Bilateral.  Pain with strength testing is absent. Pain present upon palpation of distal achilles insertion, Bilateral.  within normal limits medial longitudinal arch, Bilateral.  Ankle ROM decreased,Bilateral.  1st MPJ ROM within normal limits, Bilateral.  Dorsally contracted digits present digits , Bilateral. Other foot deformities palapble bone deformity b/l posterior heel. Integument: Warm, dry, supple, bilateral.  Open lesion absent, bilateral.  Interdigital maceration absent to web spaces bilateral.  Nails within normal limits. Fissures absent, bilateral. Hyperkeratotic tissue is absent.      Xray: aggressive posterior heel spur, high calcaneal inclination angle    Asessment: Patient is a 48 y.o. male with:    Diagnosis Orders   1. Calcaneal spur of both feet  Amb External Referral To Physical Therapy   2. Achilles tendinitis of both lower extremities  Amb External Referral To Physical Therapy   3. Pes cavus  Amb External Referral To Physical Therapy       Plan: Patient examined and evaluated. Current condition and treatment options discussed in detail. X rays reviewed with the pt in detail. Aggressive spurring noted posterior calcaneus b/l. Orders Placed This Encounter   Medications    diclofenac sodium (VOLTAREN) 1 % GEL     Sig: Apply 4 g topically 4 times daily     Dispense:  5 Tube     Refill:  0   continue conservative care for achilles tendon. Orders Placed This Encounter   Procedures    Amb External Referral To Physical Therapy     Referral Priority:   Routine     Referral Type:   Consult for Advice and Opinion     Referral Reason:   Specialty Services Required     Requested Specialty:   Physical Therapy     Number of Visits Requested:   1   other tx options discussed based on how pt responds to therapy  Contact office with any questions/problems/concerns. RTC in 3 week(s).

## 2020-11-10 ENCOUNTER — TELEPHONE (OUTPATIENT)
Dept: PODIATRY | Age: 55
End: 2020-11-10

## 2020-11-10 NOTE — TELEPHONE ENCOUNTER
Patient was seen on 9/24/2020 and given an order to [facility]. Patient called the office to find out if the office had received prior authorization from insurance for this because someone from [facility] physical therapy advised him to check. [phone].

## 2020-11-16 RX ORDER — MIDODRINE HYDROCHLORIDE 10 MG/1
TABLET ORAL
Qty: 270 TABLET | Refills: 3 | Status: SHIPPED | OUTPATIENT
Start: 2020-11-16 | End: 2021-12-03

## 2020-11-25 ENCOUNTER — TELEPHONE (OUTPATIENT)
Dept: PODIATRY | Age: 55
End: 2020-11-25

## 2020-11-25 NOTE — TELEPHONE ENCOUNTER
Please call Maddie Guidry at [facility] physical therapy. She has questions regarding whether patient needs a prior authorization for physical therapy or not.

## 2021-03-04 ENCOUNTER — OFFICE VISIT (OUTPATIENT)
Dept: PODIATRY | Age: 56
End: 2021-03-04
Payer: MEDICAID

## 2021-03-04 VITALS
DIASTOLIC BLOOD PRESSURE: 68 MMHG | SYSTOLIC BLOOD PRESSURE: 120 MMHG | RESPIRATION RATE: 20 BRPM | BODY MASS INDEX: 27.15 KG/M2 | WEIGHT: 200.2 LBS | HEART RATE: 80 BPM

## 2021-03-04 DIAGNOSIS — M76.61 ACHILLES TENDINITIS OF BOTH LOWER EXTREMITIES: Primary | ICD-10-CM

## 2021-03-04 DIAGNOSIS — M21.6X9 ACQUIRED EQUINUS DEFORMITY OF FOOT, UNSPECIFIED LATERALITY: ICD-10-CM

## 2021-03-04 DIAGNOSIS — M76.62 ACHILLES TENDINITIS OF BOTH LOWER EXTREMITIES: Primary | ICD-10-CM

## 2021-03-04 PROCEDURE — 3017F COLORECTAL CA SCREEN DOC REV: CPT | Performed by: PODIATRIST

## 2021-03-04 PROCEDURE — L4386 NON-PNEUM WALK BOOT PRE CST: HCPCS | Performed by: PODIATRIST

## 2021-03-04 PROCEDURE — G8484 FLU IMMUNIZE NO ADMIN: HCPCS | Performed by: PODIATRIST

## 2021-03-04 PROCEDURE — 1036F TOBACCO NON-USER: CPT | Performed by: PODIATRIST

## 2021-03-04 PROCEDURE — 99213 OFFICE O/P EST LOW 20 MIN: CPT | Performed by: PODIATRIST

## 2021-03-04 PROCEDURE — 99213 OFFICE O/P EST LOW 20 MIN: CPT

## 2021-03-04 PROCEDURE — G8427 DOCREV CUR MEDS BY ELIG CLIN: HCPCS | Performed by: PODIATRIST

## 2021-03-04 PROCEDURE — G8419 CALC BMI OUT NRM PARAM NOF/U: HCPCS | Performed by: PODIATRIST

## 2021-03-04 RX ORDER — TRIAMCINOLONE ACETONIDE 0.25 MG/G
CREAM TOPICAL
COMMUNITY
Start: 2020-12-09 | End: 2022-02-11

## 2021-03-04 NOTE — PROGRESS NOTES
Subjective:  Ferny Birmingham is a 50 y.o. male who presents to the office today complaining of pain on the back of heels. Right side much worse than the left. Patient states overall minimal to no benefit with any treatment she is done to the state. . Patient relates pain is Present. Pain is rated 5 out of 10 and is described as moderate. Currently denies F/C/N/V. Allergies   Allergen Reactions    Ibuprofen      Kidney disease    Pcn [Penicillins] Rash       Past Medical History:   Diagnosis Date    Hyperthyroidism     Stage 3 chronic kidney disease        Prior to Admission medications    Medication Sig Start Date End Date Taking? Authorizing Provider   triamcinolone (KENALOG) 1.495 % cream 1 APPLICATION ONCE A DAY FOR 10 DAYS 12/9/20  Yes Historical Provider, MD   midodrine (PROAMATINE) 10 MG tablet take 1 tablet by mouth three times a day 11/16/20  Yes Antony Cutler DO   Cholecalciferol (VITAMIN D) 2000 units CAPS capsule Take 1 capsule by mouth daily   Yes Historical Provider, MD   benztropine (COGENTIN) 2 MG tablet Take 2 mg by mouth 3 times daily. Yes Historical Provider, MD   fenofibrate (TRICOR) 145 MG tablet Take 145 mg by mouth daily    Yes Historical Provider, MD   pravastatin (PRAVACHOL) 40 MG tablet Take 40 mg by mouth daily. Yes Historical Provider, MD   risperiDONE (RISPERDAL) 4 MG tablet Take 4 mg by mouth daily. Yes Historical Provider, MD   diclofenac sodium (VOLTAREN) 1 % GEL Apply 4 g topically 4 times daily 9/24/20 10/24/20  Andressa Campos DPM   Elastic Bandages & Supports (ANKLE SPLINT/NIGHT AIRFORM) MISC 1 Device by Does not apply route every evening Achilles tendinitis of both lower extremities  (primary encounter diagnosis)  Kelsy's deformity, bilateral  Equinus deformity    Dispense posterior night splint.   Patient not taking: Reported on 3/4/2021 9/3/20   Andressa Campos DPM       Past Surgical History:   Procedure Laterality Date    COLONOSCOPY      THROAT SURGERY 9/17/1984    Dr. Gino Cheek ENT double set of vocal cords     UPPER GASTROINTESTINAL ENDOSCOPY  4-2-15    mild gastritis, esophagitis       Family History   Problem Relation Age of Onset    High Blood Pressure Mother     Diabetes Maternal Aunt     Diabetes Maternal Grandmother     Cancer Maternal Grandfather         lung cancer       Social History     Tobacco Use    Smoking status: Never Smoker    Smokeless tobacco: Never Used   Substance Use Topics    Alcohol use: No       ROS: All 14 ROS systems reviewed and pertinent positives noted above, all others negative. Lower Extremity Physical Examination:     Vitals:   Vitals:    03/04/21 1104   BP: 120/68   Pulse: 80   Resp: 20     General: AAO x 3 in NAD. Vascular: DP and PT pulses palpable 2/4, bilateral.  CFT <3 seconds, bilateral.  Hair growth present to the level of the digits, bilateral.  Edema absent, bilateral.  Varicosities absent, bilateral. Erythema absent, bilateral. Distal Rubor absent bilateral.  Temperature within normal limits bilateral. Hyperpigmentation absent bilateral. No atrophic skin. Neurological: Sensation intact to light touch to level of digits, bilateral.  Protective sensation intact 10/10 sites via 5.07/10g [place]-Ranjan Monofilament, bilateral.  negative Tinel's, bilateral.  negative Valleix sign, bilateral.  Vibratory intact bilateral.  Reflexes Decreased bilateral.  Paresthesias negative. Dysthesias negative. Sharp/dull intact bilateral.    Musculoskeletal: Muscle strength 5/5, Bilateral.  Pain with strength testing is absent. Pain present upon palpation of distal achilles insertion, R.  increased medial longitudinal arch, Bilateral.  Ankle ROM decreased,Bilateral.  1st MPJ ROM within normal limits, Bilateral.  Dorsally contracted digits present digits , Bilateral. Other foot deformities palapble bone deformity b/l posterior heel.     Integument: Warm, dry, supple, bilateral.  Open lesion absent, bilateral. Interdigital maceration absent to web spaces bilateral.  Nails within normal limits. Fissures absent, bilateral. Hyperkeratotic tissue is absent. Xray: aggressive posterior heel spur, high calcaneal inclination angle    Asessment: Patient is a 50 y.o. male with:    Diagnosis Orders   1. Achilles tendinitis of both lower extremities  PROCARE XCELTRAX Walking Boot   2. Acquired equinus deformity of foot, unspecified laterality  PROCARE XCELTRAX Walking Boot       Plan: Patient examined and evaluated. Current condition and treatment options discussed in detail. Old X rays reviewed with the pt in detail. continue conservative care for achilles tendon. Orders Placed This Encounter   Procedures    PROCARE [facility] Walking Boot     Patient was prescribed a [employer]. The right  foot/ankle will require stabilization / immobilization from this semi-rigid / rigid orthosis to improve their function. The orthosis will assist in protecting the affected area, provide functional support and facilitate healing. The patient was educated and fit by a healthcare professional with expert knowledge and specialization in brace application while under the direct supervision of the physician. Verbal and written instructions for the use of and application of this item were provided. They were instructed to contact the office immediately should the brace result in increased pain, decreased sensation, increased swelling or worsening of the condition. Due to level of pain/deformity/condition, it is in my medical opinion that patient will benefit from and is medically necessary for offloading device at this time. Patient was dispensed a cam walker R to wear 100% of the time WB. Patient was educated on appropriate use of the device and the need to be compliant with offloading therapy.   Patient understands that non compliance with the device will be detrimental to the healing of the condition/ulceration. Patient needs the device to help support the foot and reduce pain. This device is medically necessary due to above listed diagnosis. If no benefit with immobilization then pt should see rearfoot surgeon for possible sx intervention. Patient low level medical decision making this visit. Patient is chronic exacerbation of condition. No new testing. We will test reviewed. Low risk of morbidity with current treatment course. Contact office with any questions/problems/concerns. RTC in 3 week(s).

## 2021-03-30 ENCOUNTER — OFFICE VISIT (OUTPATIENT)
Dept: PODIATRY | Age: 56
End: 2021-03-30
Payer: MEDICAID

## 2021-03-30 VITALS
DIASTOLIC BLOOD PRESSURE: 74 MMHG | BODY MASS INDEX: 27.77 KG/M2 | HEART RATE: 82 BPM | HEIGHT: 72 IN | SYSTOLIC BLOOD PRESSURE: 118 MMHG | WEIGHT: 205 LBS

## 2021-03-30 DIAGNOSIS — M21.6X9 ACQUIRED EQUINUS DEFORMITY OF FOOT, UNSPECIFIED LATERALITY: ICD-10-CM

## 2021-03-30 DIAGNOSIS — M77.31 CALCANEAL SPUR OF BOTH FEET: Primary | ICD-10-CM

## 2021-03-30 DIAGNOSIS — M76.61 ACHILLES TENDINITIS OF BOTH LOWER EXTREMITIES: ICD-10-CM

## 2021-03-30 DIAGNOSIS — Q66.70 PES CAVUS: ICD-10-CM

## 2021-03-30 DIAGNOSIS — M76.62 ACHILLES TENDINITIS OF BOTH LOWER EXTREMITIES: ICD-10-CM

## 2021-03-30 DIAGNOSIS — M77.32 CALCANEAL SPUR OF BOTH FEET: Primary | ICD-10-CM

## 2021-03-30 PROCEDURE — 3017F COLORECTAL CA SCREEN DOC REV: CPT | Performed by: PODIATRIST

## 2021-03-30 PROCEDURE — G8419 CALC BMI OUT NRM PARAM NOF/U: HCPCS | Performed by: PODIATRIST

## 2021-03-30 PROCEDURE — 99213 OFFICE O/P EST LOW 20 MIN: CPT | Performed by: PODIATRIST

## 2021-03-30 PROCEDURE — G8427 DOCREV CUR MEDS BY ELIG CLIN: HCPCS | Performed by: PODIATRIST

## 2021-03-30 PROCEDURE — G8484 FLU IMMUNIZE NO ADMIN: HCPCS | Performed by: PODIATRIST

## 2021-03-30 PROCEDURE — 1036F TOBACCO NON-USER: CPT | Performed by: PODIATRIST

## 2021-03-30 NOTE — PROGRESS NOTES
Subjective:  Miguel Duque is a 50 y.o. male who presents to the office today complaining of pain on the back of heels. Pain comes and goes. No relief with use of cam walker. Patient relates pain is Present. Pain is rated 5 out of 10 and is described as moderate. Currently denies F/C/N/V. Allergies   Allergen Reactions    Ibuprofen      Kidney disease    Pcn [Penicillins] Rash       Past Medical History:   Diagnosis Date    Hyperthyroidism     Stage 3 chronic kidney disease        Prior to Admission medications    Medication Sig Start Date End Date Taking? Authorizing Provider   triamcinolone (KENALOG) 4.780 % cream 1 APPLICATION ONCE A DAY FOR 10 DAYS 12/9/20  Yes Historical Provider, MD   midodrine (PROAMATINE) 10 MG tablet take 1 tablet by mouth three times a day 11/16/20  Yes Antony Cutler, DO   Elastic Bandages & Supports (ANKLE SPLINT/NIGHT AIRFORM) MISC 1 Device by Does not apply route every evening Achilles tendinitis of both lower extremities  (primary encounter diagnosis)  Kelsy's deformity, bilateral  Equinus deformity    Dispense posterior night splint. 9/3/20  Yes Rafia Vincent DPM   Cholecalciferol (VITAMIN D) 2000 units CAPS capsule Take 1 capsule by mouth daily   Yes Historical Provider, MD   benztropine (COGENTIN) 2 MG tablet Take 2 mg by mouth 3 times daily. Yes Historical Provider, MD   fenofibrate (TRICOR) 145 MG tablet Take 145 mg by mouth daily    Yes Historical Provider, MD   pravastatin (PRAVACHOL) 40 MG tablet Take 40 mg by mouth daily. Yes Historical Provider, MD   risperiDONE (RISPERDAL) 4 MG tablet Take 4 mg by mouth daily.    Yes Historical Provider, MD   diclofenac sodium (VOLTAREN) 1 % GEL Apply 4 g topically 4 times daily 9/24/20 10/24/20  Rafia Vincent DPM       Past Surgical History:   Procedure Laterality Date    COLONOSCOPY      THROAT SURGERY  9/17/1984    Dr. Janeth Nance ENT double set of vocal cords     UPPER GASTROINTESTINAL ENDOSCOPY  4-2-15    mild gastritis, esophagitis       Family History   Problem Relation Age of Onset    High Blood Pressure Mother     Diabetes Maternal Aunt     Diabetes Maternal Grandmother     Cancer Maternal Grandfather         lung cancer       Social History     Tobacco Use    Smoking status: Never Smoker    Smokeless tobacco: Never Used   Substance Use Topics    Alcohol use: No       ROS: All 14 ROS systems reviewed and pertinent positives noted above, all others negative. Lower Extremity Physical Examination:     Vitals:   Vitals:    03/30/21 1105   BP: 118/74   Pulse: 82     General: AAO x 3 in NAD. Vascular: DP and PT pulses palpable 2/4, bilateral.  CFT <3 seconds, bilateral.  Hair growth present to the level of the digits, bilateral.  Edema absent, bilateral.  Varicosities absent, bilateral. Erythema absent, bilateral. Distal Rubor absent bilateral.  Temperature within normal limits bilateral. Hyperpigmentation absent bilateral. No atrophic skin. Neurological: Sensation intact to light touch to level of digits, bilateral.  Protective sensation intact 10/10 sites via 5.07/10g [place]-Ranjan Monofilament, bilateral.  negative Tinel's, bilateral.  negative Valleix sign, bilateral.  Vibratory intact bilateral.  Reflexes Decreased bilateral.  Paresthesias negative. Dysthesias negative. Sharp/dull intact bilateral.    Musculoskeletal: Muscle strength 5/5, Bilateral.  Pain with strength testing is absent. Pain present upon palpation of distal achilles insertion, R.  increased medial longitudinal arch, Bilateral.  Ankle ROM decreased,Bilateral.  1st MPJ ROM within normal limits, Bilateral.  Dorsally contracted digits present digits , Bilateral. Other foot deformities palapble bone deformity b/l posterior heel. Integument: Warm, dry, supple, bilateral.  Open lesion absent, bilateral.  Interdigital maceration absent to web spaces bilateral.  Nails within normal limits.   Fissures absent, bilateral. Hyperkeratotic tissue is absent. Asessment: Patient is a 50 y.o. male with:    Diagnosis Orders   1. Calcaneal spur of both feet     2. Achilles tendinitis of both lower extremities     3. Acquired equinus deformity of foot, unspecified laterality     pes cavus    Plan: Patient examined and evaluated. Current condition and treatment options discussed in detail. continue conservative care for achilles tendon. Patient will stop CAM Walker. No benefit currently. Orders Placed This Encounter   Procedures    Ambulatory referral to Orthopedic Surgery     Referral Priority:   Routine     Referral Type:   Eval and Treat     Referral Reason:   Specialty Services Required     Referred to Provider:   Lisa Candelaria MD     Requested Specialty:   Orthopedic Surgery     Number of Visits Requested:   1     Will refer to Dr. Reynaldo Jose for possible surgical intervention for the large posterior heel spurs with chronic pain. Patient low level medical decision making this visit. Patient is chronic exacerbation of condition. No new testing. We will test reviewed. Low risk of morbidity with current treatment course. Contact office with any questions/problems/concerns.   RTC in as needed

## 2021-04-15 DIAGNOSIS — M25.571 BILATERAL ANKLE PAIN, UNSPECIFIED CHRONICITY: Primary | ICD-10-CM

## 2021-04-15 DIAGNOSIS — M79.671 BILATERAL FOOT PAIN: ICD-10-CM

## 2021-04-15 DIAGNOSIS — M79.672 BILATERAL FOOT PAIN: ICD-10-CM

## 2021-04-15 DIAGNOSIS — M25.572 BILATERAL ANKLE PAIN, UNSPECIFIED CHRONICITY: Primary | ICD-10-CM

## 2021-04-22 ENCOUNTER — OFFICE VISIT (OUTPATIENT)
Dept: ORTHOPEDIC SURGERY | Age: 56
End: 2021-04-22
Payer: MEDICAID

## 2021-04-22 ENCOUNTER — HOSPITAL ENCOUNTER (OUTPATIENT)
Dept: GENERAL RADIOLOGY | Age: 56
Discharge: HOME | End: 2021-04-24
Payer: MEDICAID

## 2021-04-22 VITALS
WEIGHT: 205 LBS | SYSTOLIC BLOOD PRESSURE: 90 MMHG | BODY MASS INDEX: 27.77 KG/M2 | DIASTOLIC BLOOD PRESSURE: 60 MMHG | HEIGHT: 72 IN | HEART RATE: 101 BPM

## 2021-04-22 DIAGNOSIS — M79.671 BILATERAL FOOT PAIN: ICD-10-CM

## 2021-04-22 DIAGNOSIS — M25.572 BILATERAL ANKLE PAIN, UNSPECIFIED CHRONICITY: ICD-10-CM

## 2021-04-22 DIAGNOSIS — M92.60 HAGLUND'S DEFORMITY: ICD-10-CM

## 2021-04-22 DIAGNOSIS — M76.62 ACHILLES TENDINITIS OF BOTH LOWER EXTREMITIES: Primary | ICD-10-CM

## 2021-04-22 DIAGNOSIS — M76.61 ACHILLES TENDINITIS OF BOTH LOWER EXTREMITIES: Primary | ICD-10-CM

## 2021-04-22 DIAGNOSIS — M25.571 BILATERAL ANKLE PAIN, UNSPECIFIED CHRONICITY: ICD-10-CM

## 2021-04-22 DIAGNOSIS — M79.672 BILATERAL FOOT PAIN: ICD-10-CM

## 2021-04-22 PROCEDURE — 99214 OFFICE O/P EST MOD 30 MIN: CPT | Performed by: ORTHOPAEDIC SURGERY

## 2021-04-22 PROCEDURE — G8427 DOCREV CUR MEDS BY ELIG CLIN: HCPCS | Performed by: ORTHOPAEDIC SURGERY

## 2021-04-22 PROCEDURE — 3017F COLORECTAL CA SCREEN DOC REV: CPT | Performed by: ORTHOPAEDIC SURGERY

## 2021-04-22 PROCEDURE — 73630 X-RAY EXAM OF FOOT: CPT

## 2021-04-22 PROCEDURE — 73610 X-RAY EXAM OF ANKLE: CPT

## 2021-04-22 PROCEDURE — 1036F TOBACCO NON-USER: CPT | Performed by: ORTHOPAEDIC SURGERY

## 2021-04-22 PROCEDURE — 99204 OFFICE O/P NEW MOD 45 MIN: CPT | Performed by: ORTHOPAEDIC SURGERY

## 2021-04-22 PROCEDURE — G8419 CALC BMI OUT NRM PARAM NOF/U: HCPCS | Performed by: ORTHOPAEDIC SURGERY

## 2021-04-22 NOTE — LETTER
Dr. [address] [facility]  [address] [place] [address] [phone]        4/22/2021     Patient: Jim Rouse  YOB: 1965    Dear Eros Langston DPM,    I had the pleasure of seeing one of your patients, Jim Ruose, recently in the office. Below are the relevant portions of my assessment and plan of care. ASSESSMENT AND PLAN:  He has right greater than left insertional Achilles tendinitis with posterior calcaneal enthesophytes and Kelsy's deformity. Notably, he has the past medical history as above. He has a history of paranoid schizophrenia per EMR, as well as stage III chronic kidney disease. We had a discussion today about the likely diagnosis and its natural history, physical exam and imaging findings, as well as various treatment options in detail. Surgically, we discussed a possible future debridement of the Achilles tendon (with possible tendon transfer and/or bony resection as needed), should the patient not respond significantly to conservative management. We discussed the expected postoperative course, including the relevant weightbearing restrictions and immobilization. At this point, the patient is interested in proceeding with surgery in the near future. Orders/referrals were placed as below at today's visit. I also ordered physical therapy for the patient to hep reinforce/teach the relevant weight bearing precautions, to help allow for safe transfers and early mobilization, as well as effectively utilize DME. In order to know exactly how to proceed with treatment (surgical versus nonsurgical, as well as how), an MRI was ordered today to evaluate the Achilles tendon. This is medically necessary to evaluate the structures in this area, for both diagnosis and treatment. All questions were answered and the above plan was agreed upon.  The patient will return to clinic after the MRI has been obtained without x-rays. At his next visit, we will review his MRI, follow-up on his DME eval, and possibly pick a surgical date. Thank you for allowing me to participate in the care of this patient. I look forward to serving you and your patients again in the future. Please don't hesitate to contact me at my mobile number .         Rajat Aden MD  Orthopedic Surgery

## 2021-04-22 NOTE — PROGRESS NOTES
[address] [place] [id number]  Dept: [phone]  Loc: [phone]    Ambulatory Orthopedic Consult      CHIEF COMPLAINT:    Chief Complaint   Patient presents with    Foot Pain     gerson foot pain    Ankle Pain     gerson ankle pain       HISTORY OF PRESENT ILLNESS:      The patient is a 50 y.o. male who is being seen for evaluation of pain at the above location at the posterior heel/Achilles tendon on the right greater than left. The pain is described mainly with mechanical terms (dull/sharp/throbbing). The pain is worse with activity and better with rest. The patient reports a progressive course. The patient has tried:      [x]  rest/activity modification          [x]  NSAIDs      []  opiates      []  orthotics        [x]  change in shoes   [x]  home exercises  [x]  physical therapy      [x]  CAM boot     []  brace:    []  injection:       []  surgery:      The patient is referred here today by Dr. Subha Do. REVIEW OF SYSTEMS:  Constitutional: Negative for fever. HENT: Negative for tinnitus. Eyes: Negative for pain. Respiratory: Negative for shortness of breath. Cardiovascular: Negative for chest pain. Gastrointestinal: Negative for abdominal pain. Genitourinary: Negative for dysuria. Skin: Negative for rash. Neurological: Negative for headaches. Hematological: Does not bruise/bleed easily. Musculoskeletal: See HPI for pertinent positives     Past Medical History:    He  has a past medical history of Hyperthyroidism and Stage 3 chronic kidney disease. Past Surgical History:    He  has a past surgical history that includes Throat surgery (9/17/1984); Upper gastrointestinal endoscopy (4-2-15); and Colonoscopy.      Current Medications:     Current Outpatient Medications:     triamcinolone (KENALOG) 4.473 % cream, 1 APPLICATION ONCE A DAY FOR 10 DAYS, Disp: , Rfl:     midodrine (PROAMATINE) 10 MG tablet, take 1 tablet by mouth three times a day, Disp: 270 tablet, Rfl: 3    Elastic Bandages & Supports (ANKLE SPLINT/NIGHT AIRFORM) MISC, 1 Device by Does not apply route every evening Achilles tendinitis of both lower extremities  (primary encounter diagnosis) Kelsy's deformity, bilateral Equinus deformity  Dispense posterior night splint., Disp: 1 each, Rfl: 0    Cholecalciferol (VITAMIN D) 2000 units CAPS capsule, Take 1 capsule by mouth daily, Disp: , Rfl:     benztropine (COGENTIN) 2 MG tablet, Take 2 mg by mouth 3 times daily. , Disp: , Rfl:     fenofibrate (TRICOR) 145 MG tablet, Take 145 mg by mouth daily , Disp: , Rfl:     pravastatin (PRAVACHOL) 40 MG tablet, Take 40 mg by mouth daily. , Disp: , Rfl:     risperiDONE (RISPERDAL) 4 MG tablet, Take 4 mg by mouth daily. , Disp: , Rfl:     diclofenac sodium (VOLTAREN) 1 % GEL, Apply 4 g topically 4 times daily, Disp: 5 Tube, Rfl: 0     Allergies:    Ibuprofen and Pcn [penicillins]    Family History:  family history includes Cancer in his maternal grandfather; Diabetes in his maternal aunt and maternal grandmother; High Blood Pressure in his mother. 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  No significant swelling of foot.  Skin: Intact without rash/lesions/AV malformations. Strength: Able to fire/perform the following with appropriate strength:    [x]  Tib Ant:     [x]  Gastroc-Soleus:         [x]  Inversion:    [x]  Eversion:         [x]  FHL:     [x]  EHL:      Motion:  Normal for the following joints:    [x]  Ankle:      [x]  Subtalar:        [x]  1st MTP:      []  1st TMT:            Tenderness to Palpation:    Tenderness to palpation: Posterior heel with obvious deformity  -no nodules/thickening of Achilles tendon palpated  -no palpable gap of Achilles tendon noted  -no pain with resisted plantarflexion      LLE:  Vascular: Toes warm and well perfused, compartments soft/compressible. No significant swelling of foot. Skin: Intact without rash/lesions/AV malformations. Strength: Able to fire/perform the following with appropriate strength:    [x]  Tib Ant:     [x]  Gastroc-Soleus:         [x]  Inversion:    [x]  Eversion:         [x]  FHL:     [x]  EHL:      Motion:  Normal for the following joints:    [x]  Ankle:      [x]  Subtalar:        [x]  1st MTP:      []  1st TMT:            Tenderness to Palpation:    Tenderness to palpation: Posterior heel with obvious deformity  -no nodules/thickening of Achilles tendon palpated  -no palpable gap of Achilles tendon noted  -no pain with resisted plantarflexion      RADIOLOGY:   4/22/2021 FINDINGS:  Three weightbearing views (AP, Mortise, and Lateral) of the bilateral ankle and three weightbearing views (AP, Oblique, Lateral) of the bilateral foot were obtained in the office today and reviewed, revealing no acute fracture, dislocation, or radioopaque foreign body/tumor. The ankle mortise is maintained with no widening of the clear spaces. Posterior calcaneal bone spurs at the achilles tendon insertion bilaterally. IMPRESSION:  No acute fracture/dislocation.     Electronically signed by Krysta Shelby MD        Xr Ankle Left (min 3 Views)    Result Date: 4/22/2021  Studies of both ankles and both feet demonstrate no acute abnormality or degenerative change. Bilateral calcaneal enthesopathy at Achilles tendon attachment. Xr Ankle Right (min 3 Views)    Result Date: 4/22/2021  Studies of both ankles and both feet demonstrate no acute abnormality or degenerative change. Bilateral calcaneal enthesopathy at Achilles tendon attachment. Xr Foot Left (min 3 Views)    Result Date: 4/22/2021  Studies of both ankles and both feet demonstrate no acute abnormality or degenerative change. Bilateral calcaneal enthesopathy at Achilles tendon attachment. Xr Foot Right (min 3 Views)    Result Date: 4/22/2021  Studies of both ankles and both feet demonstrate no acute abnormality or degenerative change. Bilateral calcaneal enthesopathy at Achilles tendon attachment. ASSESSMENT AND PLAN:  Body mass index is 27.8 kg/m². He has right greater than left insertional Achilles tendinitis with posterior calcaneal enthesophytes and Kelsy's deformity. Notably, he has the past medical history as above. He has a history of paranoid schizophrenia per EMR, as well as stage III chronic kidney disease. We had a discussion today about the likely diagnosis and its natural history, physical exam and imaging findings, as well as various treatment options in detail. Surgically, we discussed a possible future debridement of the Achilles tendon (with possible tendon transfer and/or bony resection as needed), should the patient not respond significantly to conservative management. We discussed the expected postoperative course, including the relevant weightbearing restrictions and immobilization. At this point, the patient is interested in proceeding with surgery in the near future. Orders/referrals were placed as below at today's visit.      I also ordered physical therapy for the patient to hep reinforce/teach the relevant weight bearing precautions, to help allow for safe transfers and early mobilization, as well as effectively utilize DME. In order to know exactly how to proceed with treatment (surgical versus nonsurgical, as well as how), an MRI was ordered today to evaluate the Achilles tendon. This is medically necessary to evaluate the structures in this area, for both diagnosis and treatment. All questions were answered and the above plan was agreed upon. The patient will return to clinic after the MRI has been obtained without x-rays. At his next visit, we will review his MRI, follow-up on his DME eval, and possibly pick a surgical date. At the patient's next visit, depending on how the patient is doing and/or new imaging/labs results, we may consider the following options:    []  Orthotic (OTC)     []  Orthotic (custom)          []  Rocker bottom shoes     []  Brace (OTC)        []  Brace (custom)             []  CAM boot        []  Night splint         []  Heel cups        []  Strap      []  Toe sleeves/splints    []  PT:                     []  Wean out of immobilization   []  Advance activity       []  Topical               []  NSAIDs          []  Ashley         []  Referral:         []  Stress xrays       []  CT         []  MRI        []  Injection:         []  Consider OR      []  Pick OR date    No follow-ups on file. No orders of the defined types were placed in this encounter.     Orders Placed This Encounter   Procedures    MRI ANKLE RIGHT WO CONTRAST     MUST BE SCHEDULED AT [facility]  MUST BE DONE ON 3T MAGNET OR GREATER  Please have read by [facility] Radiologist     Standing Status:   Future     Standing Expiration Date:   4/22/2022     Order Specific Question:   Reason for exam:     Answer:   eval achilles    Ambulatory referral to Physical Therapy     Referral Priority:   Routine     Referral Type:   Eval and Treat     Referral Reason:   Specialty Services Required     Requested Specialty:   Physical Therapy     Number of Visits Requested:   1         Salome Serum Ashu Menard MD  Orthopedic Surgery        Please excuse any typos/errors, as this note was created with the assistance of voice recognition software. While intending to generate a document that actually reflects the content of the visit, the document can still have some errors including those of syntax and sound-a-like substitutions which may escape proof reading. In such instances, actual meaning can be extrapolated by context.

## 2021-04-22 NOTE — LETTER
Dr. Laura Veloz  [address] [place] [address] [phone]        4/22/2021     Patient: Reece Briggs  YOB: 1965    Dear BABAK Reese CNP,    I had the pleasure of seeing one of your patients, Reece Briggs recently in the office. Below are the relevant portions of my assessment and plan of care. ASSESSMENT AND PLAN:  He has right greater than left insertional Achilles tendinitis with posterior calcaneal enthesophytes and Kelsy's deformity. Notably, he has the past medical history as above. He has a history of paranoid schizophrenia per EMR, as well as stage III chronic kidney disease. We had a discussion today about the likely diagnosis and its natural history, physical exam and imaging findings, as well as various treatment options in detail. Surgically, we discussed a possible future debridement of the Achilles tendon (with possible tendon transfer and/or bony resection as needed), should the patient not respond significantly to conservative management. We discussed the expected postoperative course, including the relevant weightbearing restrictions and immobilization. At this point, the patient is interested in proceeding with surgery in the near future. Orders/referrals were placed as below at today's visit. I also ordered physical therapy for the patient to hep reinforce/teach the relevant weight bearing precautions, to help allow for safe transfers and early mobilization, as well as effectively utilize DME. In order to know exactly how to proceed with treatment (surgical versus nonsurgical, as well as how), an MRI was ordered today to evaluate the Achilles tendon. This is medically necessary to evaluate the structures in this area, for both diagnosis and treatment. All questions were answered and the above plan was agreed upon.  The patient will return to clinic after the

## 2021-04-27 ENCOUNTER — HOSPITAL ENCOUNTER (OUTPATIENT)
Dept: MRI IMAGING | Age: 56
Discharge: HOME | End: 2021-04-29
Payer: MEDICAID

## 2021-04-27 DIAGNOSIS — M76.62 ACHILLES TENDINITIS OF BOTH LOWER EXTREMITIES: ICD-10-CM

## 2021-04-27 DIAGNOSIS — M76.61 ACHILLES TENDINITIS OF BOTH LOWER EXTREMITIES: ICD-10-CM

## 2021-04-27 PROCEDURE — 73721 MRI JNT OF LWR EXTRE W/O DYE: CPT

## 2021-06-24 ENCOUNTER — OFFICE VISIT (OUTPATIENT)
Dept: ORTHOPEDIC SURGERY | Age: 56
End: 2021-06-24
Payer: MEDICAID

## 2021-06-24 VITALS
BODY MASS INDEX: 27.77 KG/M2 | SYSTOLIC BLOOD PRESSURE: 108 MMHG | WEIGHT: 205 LBS | DIASTOLIC BLOOD PRESSURE: 60 MMHG | HEIGHT: 72 IN | HEART RATE: 85 BPM

## 2021-06-24 DIAGNOSIS — M92.60 HAGLUND'S DEFORMITY: ICD-10-CM

## 2021-06-24 DIAGNOSIS — M76.61 ACHILLES TENDINITIS OF BOTH LOWER EXTREMITIES: Primary | ICD-10-CM

## 2021-06-24 DIAGNOSIS — M76.62 ACHILLES TENDINITIS OF BOTH LOWER EXTREMITIES: Primary | ICD-10-CM

## 2021-06-24 PROCEDURE — 1036F TOBACCO NON-USER: CPT | Performed by: ORTHOPAEDIC SURGERY

## 2021-06-24 PROCEDURE — G8419 CALC BMI OUT NRM PARAM NOF/U: HCPCS | Performed by: ORTHOPAEDIC SURGERY

## 2021-06-24 PROCEDURE — 3017F COLORECTAL CA SCREEN DOC REV: CPT | Performed by: ORTHOPAEDIC SURGERY

## 2021-06-24 PROCEDURE — G8427 DOCREV CUR MEDS BY ELIG CLIN: HCPCS | Performed by: ORTHOPAEDIC SURGERY

## 2021-06-24 PROCEDURE — 99214 OFFICE O/P EST MOD 30 MIN: CPT | Performed by: ORTHOPAEDIC SURGERY

## 2021-06-24 PROCEDURE — 99212 OFFICE O/P EST SF 10 MIN: CPT | Performed by: ORTHOPAEDIC SURGERY

## 2021-06-24 NOTE — LETTER
Dr. Vyas Courser  [address] [facility] [phone]        6/24/2021     Patient: Pratibha Briscoe  YOB: 1965    Dear BABAK Gan CNP,    I had the pleasure of seeing one of your patients, Pratibha Briscoe, recently in the office. We have decided to proceed with surgery, and the patient may be reaching out to your office in the near future for medical clearance/optimization. Below are the relevant portions of my assessment and plan of care. ASSESSMENT AND PLAN:  He has right greater than left insertional Achilles tendinitis with posterior calcaneal enthesophytes and Kelsy's deformity. Notably, he has the past medical history as above. He has a history of paranoid schizophrenia per EMR, as well as stage III chronic kidney disease. We had a discussion today about the likely diagnosis and its natural history, physical exam and imaging findings, as well as various treatment options in detail. Surgically, we discussed a right Achilles tendon debridement with Kelsy's excision. We discussed the expected postoperative course, including the relevant weightbearing restrictions and immobilization. Given his past medical history as above (chronic kidney disease), we discussed that the patient is at higher risk for complications, and we discussed the risks of rerupture, wound healing issues, infection, future surgery, as well as incomplete pain relief. At this point, as he does report that his pain is significantly bothering him, he does wish to proceed with surgery in the near future. We did discuss the postoperative course in the setting of his work situation (reports that he works 4-hour shifts 4 days a week), and discussed his anticipated return to work timeframe. Orders/referrals were placed as below at today's visit. At today's visit, the patient was ordered DME as below.  I also ordered

## 2021-06-24 NOTE — PROGRESS NOTES
[facility]  [place] [address] [place] [id number]  Dept: [phone]  Loc: [phone]    Ambulatory Orthopedic Consult      CHIEF COMPLAINT:    Chief Complaint   Patient presents with    Foot Pain     right foot pain, 2 mo follow up with MRI results of right ankle       HISTORY OF PRESENT ILLNESS:      The patient is a 50 y.o. male who is being seen for evaluation of pain at the above location at the posterior heel/Achilles tendon on the right greater than left. The pain is described mainly with mechanical terms (dull/sharp/throbbing). The pain is worse with activity and better with rest. The patient reports a progressive course. The patient has tried:      [x]  rest/activity modification          [x]  NSAIDs      []  opiates      []  orthotics        [x]  change in shoes   [x]  home exercises  [x]  physical therapy      [x]  CAM boot     []  brace:    []  injection:       []  surgery:      The patient is referred here today by Dr. Jude Montana. INTERVAL HISTORY 6/24/2021:  He is seen again today in the office for follow up of imaging as below. Since being seen last, the patient is doing about the same overall. At today's visit, he is using no brace/assistive device. History is obtained today from:   [x]  the patient     [x]  EMR     []  one family member/friend    []  multiple family members/friends    []  other:        REVIEW OF SYSTEMS:  Constitutional: Negative for fever. HENT: Negative for tinnitus. Eyes: Negative for pain. Respiratory: Negative for shortness of breath. Cardiovascular: Negative for chest pain. Gastrointestinal: Negative for abdominal pain. Genitourinary: Negative for dysuria. Skin: Negative for rash. Neurological: Negative for headaches. Hematological: Does not bruise/bleed easily.    Musculoskeletal: See HPI for pertinent positives     Past Medical History:    He  has a past medical history of Hyperthyroidism and Stage 3 chronic kidney disease ([facility] [address] Past Surgical History:    He  has a past surgical history that includes Throat surgery (9/17/1984); Upper gastrointestinal endoscopy (4-2-15); and Colonoscopy. Current Medications:     Current Outpatient Medications:     triamcinolone (KENALOG) 3.466 % cream, 1 APPLICATION ONCE A DAY FOR 10 DAYS, Disp: , Rfl:     midodrine (PROAMATINE) 10 MG tablet, take 1 tablet by mouth three times a day, Disp: 270 tablet, Rfl: 3    Elastic Bandages & Supports (ANKLE SPLINT/NIGHT AIRFORM) MISC, 1 Device by Does not apply route every evening Achilles tendinitis of both lower extremities  (primary encounter diagnosis) Kelsy's deformity, bilateral Equinus deformity  Dispense posterior night splint., Disp: 1 each, Rfl: 0    Cholecalciferol (VITAMIN D) 2000 units CAPS capsule, Take 1 capsule by mouth daily, Disp: , Rfl:     benztropine (COGENTIN) 2 MG tablet, Take 2 mg by mouth 3 times daily. , Disp: , Rfl:     fenofibrate (TRICOR) 145 MG tablet, Take 145 mg by mouth daily , Disp: , Rfl:     pravastatin (PRAVACHOL) 40 MG tablet, Take 40 mg by mouth daily. , Disp: , Rfl:     risperiDONE (RISPERDAL) 4 MG tablet, Take 4 mg by mouth daily. , Disp: , Rfl:     diclofenac sodium (VOLTAREN) 1 % GEL, Apply 4 g topically 4 times daily, Disp: 5 Tube, Rfl: 0     Allergies:    Ibuprofen and Pcn [penicillins]    Family History:  family history includes Cancer in his maternal grandfather; Diabetes in his maternal aunt and maternal grandmother; High Blood Pressure in his mother.     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 No significant swelling of foot. Skin: Intact without rash/lesions/AV malformations. Strength: Able to fire/perform the following with appropriate strength:    [x]  Tib Ant:     [x]  Gastroc-Soleus:         [x]  Inversion:    [x]  Eversion:         [x]  FHL:     [x]  EHL:      Motion:  Normal for the following joints:    [x]  Ankle:      [x]  Subtalar:        [x]  1st MTP:      []  1st TMT:            Tenderness to Palpation:    Tenderness to palpation: Posterior heel with obvious deformity  -no nodules/thickening of Achilles tendon palpated  -no palpable gap of Achilles tendon noted  -no pain with resisted plantarflexion      LLE:  Vascular: Toes warm and well perfused, compartments soft/compressible. No significant swelling of foot. Skin: Intact without rash/lesions/AV malformations. Strength: Able to fire/perform the following with appropriate strength:    [x]  Tib Ant:     [x]  Gastroc-Soleus:         [x]  Inversion:    [x]  Eversion:         [x]  FHL:     [x]  EHL:      Motion:  Normal for the following joints:    [x]  Ankle:      [x]  Subtalar:        [x]  1st MTP:      []  1st TMT:            Tenderness to Palpation:    Tenderness to palpation: Posterior heel with obvious deformity  -no nodules/thickening of Achilles tendon palpated  -no palpable gap of Achilles tendon noted  -no pain with resisted plantarflexion      RADIOLOGY:   6/24/2021 Prior images reviewed for reference.  MRI images and radiology report reviewed, as below:    Mild generalized thickening of the Achilles tendon without superimposed   tendinopathy or tear.     Nonspecific marrow edema within the calcaneus.       No ligament or tendon tear identified.               FINDINGS:  Three weightbearing views (AP, Mortise, and Lateral) of the bilateral ankle and three weightbearing views (AP, Oblique, Lateral) of the bilateral foot were obtained in the office today and reviewed, revealing no acute fracture, dislocation, or radioopaque foreign body/tumor. The ankle mortise is maintained with no widening of the clear spaces. Posterior calcaneal bone spurs at the achilles tendon insertion bilaterally. IMPRESSION:  No acute fracture/dislocation. Electronically signed by Miley Gregg MD        Xr Ankle Left (min 3 Views)    Result Date: 4/22/2021  Studies of both ankles and both feet demonstrate no acute abnormality or degenerative change. Bilateral calcaneal enthesopathy at Achilles tendon attachment. Xr Ankle Right (min 3 Views)    Result Date: 4/22/2021  Studies of both ankles and both feet demonstrate no acute abnormality or degenerative change. Bilateral calcaneal enthesopathy at Achilles tendon attachment. Xr Foot Left (min 3 Views)    Result Date: 4/22/2021  Studies of both ankles and both feet demonstrate no acute abnormality or degenerative change. Bilateral calcaneal enthesopathy at Achilles tendon attachment. Xr Foot Right (min 3 Views)    Result Date: 4/22/2021  Studies of both ankles and both feet demonstrate no acute abnormality or degenerative change. Bilateral calcaneal enthesopathy at Achilles tendon attachment. ASSESSMENT AND PLAN:  Body mass index is 27.8 kg/m². He has right greater than left insertional Achilles tendinitis with posterior calcaneal enthesophytes and Kelsy's deformity. Notably, he has the past medical history as above. He has a history of paranoid schizophrenia per EMR, as well as stage III chronic kidney disease.     We had a discussion today about the likely diagnosis and its natural history, physical exam and imaging findings, as well as various treatment options in detail. Surgically, we discussed a right Achilles tendon debridement with Kelsy's excision. We discussed the expected postoperative course, including the relevant weightbearing restrictions and immobilization. Given his past medical history as above (chronic kidney disease), we discussed that the patient is at higher risk for complications, and we discussed the risks of rerupture, wound healing issues, infection, future surgery, as well as incomplete pain relief. At this point, as he does report that his pain is significantly bothering him, he does wish to proceed with surgery in the near future. We did discuss the postoperative course in the setting of his work situation (reports that he works 4-hour shifts 4 days a week), and discussed his anticipated return to work timeframe. Orders/referrals were placed as below at today's visit. At today's visit, the patient was ordered DME as below. I also ordered physical therapy for the patient to help reinforce/teach the relevant weight bearing precautions, to help allow for safe transfers and early mobilization, as well as effectively utilize DME. Surgical booking paperwork was completed and submitted. All questions were answered and the above plan was agreed upon. The patient will return to clinic in the future for a surgical discussion.          At the patient's next visit, depending on how the patient is doing and/or new imaging/labs results, we may consider the following options:    []  Orthotic (OTC)     []  Orthotic (custom)          []  Rocker bottom shoes     []  Brace (OTC)        []  Brace (custom)             []  CAM boot        []  Night splint         []  Heel cups        []  Strap      []  Toe sleeves/splints    []  PT:                     []  Wean out of immobilization   []  Advance activity       []  Topical               []  NSAIDs          []  Ashley         []  Referral: []  Stress xrays       []  CT         []  MRI        []  Injection:         []  Consider OR      []  Pick OR date    No follow-ups on file. No orders of the defined types were placed in this encounter. No orders of the defined types were placed in this encounter. Beverly Torres MD  Orthopedic Surgery        Please excuse any typos/errors, as this note was created with the assistance of voice recognition software. While intending to generate a document that actually reflects the content of the visit, the document can still have some errors including those of syntax and sound-a-like substitutions which may escape proof reading. In such instances, actual meaning can be extrapolated by context.

## 2021-06-30 DIAGNOSIS — M79.671 RIGHT FOOT PAIN: Primary | ICD-10-CM

## 2021-07-07 ENCOUNTER — TELEPHONE (OUTPATIENT)
Dept: ORTHOPEDIC SURGERY | Age: 56
End: 2021-07-07

## 2021-07-07 NOTE — TELEPHONE ENCOUNTER
Spoke with Charles Ayala at [facility] office about patient's PCP clearance. Patient was seen yesterday, but PCP would like to see patient's labs before clearing him because of his kidney issues. I let her know he will have labs drawn tomorrow afternoon at [facility] then we can fax the results over. I did explain we were seeing the patient back Friday afternoon for surgery discussion. Charles Ayala stated if we can get the labs results to them as soon as they are available, they should be able to send over a clearance in time for the appointment with Dr. Dinah Us. Hieu Cervantes - Please stay on top of this as this is time sensitive.

## 2021-07-07 NOTE — TELEPHONE ENCOUNTER
Patient LM on annamarie GIBSON's VM. Since she ws out today, I checked messages. I called patient back. He wanted to verify dates of when/where he needed to be in the [place] area because he lives over an hour away. Patient states in the e-mail Jack sent him for PAT, the date was for 7/9, but day was Thursday. I did verify for the patient his PAT appointment is Thursday, 7/8/21 at 1p. He also asked if he could do PT near him so he did not have to make trips up to [place]. I told him he is scheduled to see one of our PTs on Friday at 2:30p for a DME eval and that this is just prior to his sx discussion appointment with Dr. Connie De Leon at 3p. I told him this was done probably to save him a trip. He vaguely knew of this information. He then asked when he has to do PT after surgery, if he could go somewhere closer, and I explained yes. When that time comes, he will be given our Achilles protocol and he will have to give it to the PT. The patient's step father was with him and had questions as well. I answered the above questions again (step dad is transportation) and answered post op questions, as well. Patient and is step father were concerned about the length of the trips up here to [place], but I explained to them why they are necessary for the post op treatment. Both seemed to understand. Patient and step dad were told to callback with any other questions.

## 2021-07-08 ENCOUNTER — HOSPITAL ENCOUNTER (OUTPATIENT)
Dept: PREADMISSION TESTING | Age: 56
Discharge: HOME | End: 2021-07-12
Payer: MEDICAID

## 2021-07-08 VITALS
BODY MASS INDEX: 25.29 KG/M2 | SYSTOLIC BLOOD PRESSURE: 119 MMHG | TEMPERATURE: 98 F | HEART RATE: 99 BPM | OXYGEN SATURATION: 97 % | DIASTOLIC BLOOD PRESSURE: 72 MMHG | RESPIRATION RATE: 16 BRPM | HEIGHT: 72 IN | WEIGHT: 186.7 LBS

## 2021-07-08 LAB
ANION GAP SERPL CALCULATED.3IONS-SCNC: 15 MMOL/L (ref 9–17)
BUN BLDV-MCNC: 34 MG/DL (ref 6–20)
BUN/CREAT BLD: 12 (ref 9–20)
CALCIUM SERPL-MCNC: 9.8 MG/DL (ref 8.6–10.4)
CHLORIDE BLD-SCNC: 105 MMOL/L (ref 98–107)
CO2: 20 MMOL/L (ref 20–31)
CREAT SERPL-MCNC: 2.89 MG/DL (ref 0.7–1.2)
GFR AFRICAN AMERICAN: 28 ML/MIN
GFR NON-AFRICAN AMERICAN: 23 ML/MIN
GFR SERPL CREATININE-BSD FRML MDRD: ABNORMAL ML/MIN/{1.73_M2}
GFR SERPL CREATININE-BSD FRML MDRD: ABNORMAL ML/MIN/{1.73_M2}
GLUCOSE BLD-MCNC: 88 MG/DL (ref 70–99)
HCT VFR BLD CALC: 41 % (ref 40.7–50.3)
HEMOGLOBIN: 13.1 G/DL (ref 13–17)
MCH RBC QN AUTO: 30.5 PG (ref 25.2–33.5)
MCHC RBC AUTO-ENTMCNC: 32 G/DL (ref 28.4–34.8)
MCV RBC AUTO: 95.6 FL (ref 82.6–102.9)
NRBC AUTOMATED: 0 PER 100 WBC
PDW BLD-RTO: 13.2 % (ref 11.8–14.4)
PLATELET # BLD: 261 K/UL (ref 138–453)
PMV BLD AUTO: 11.5 FL (ref 8.1–13.5)
POTASSIUM SERPL-SCNC: 4.9 MMOL/L (ref 3.7–5.3)
RBC # BLD: 4.29 M/UL (ref 4.21–5.77)
SODIUM BLD-SCNC: 140 MMOL/L (ref 135–144)
WBC # BLD: 8.6 K/UL (ref 3.5–11.3)

## 2021-07-08 PROCEDURE — 80048 BASIC METABOLIC PNL TOTAL CA: CPT

## 2021-07-08 PROCEDURE — 85027 COMPLETE CBC AUTOMATED: CPT

## 2021-07-08 PROCEDURE — 36415 COLL VENOUS BLD VENIPUNCTURE: CPT

## 2021-07-08 PROCEDURE — 93005 ELECTROCARDIOGRAM TRACING: CPT | Performed by: ORTHOPAEDIC SURGERY

## 2021-07-09 ENCOUNTER — OFFICE VISIT (OUTPATIENT)
Dept: ORTHOPEDIC SURGERY | Age: 56
End: 2021-07-09
Payer: MEDICAID

## 2021-07-09 ENCOUNTER — TELEPHONE (OUTPATIENT)
Dept: ORTHOPEDIC SURGERY | Age: 56
End: 2021-07-09

## 2021-07-09 ENCOUNTER — HOSPITAL ENCOUNTER (OUTPATIENT)
Dept: PHYSICAL THERAPY | Facility: CLINIC | Age: 56
Setting detail: THERAPIES SERIES
Discharge: HOME | End: 2021-07-09
Payer: MEDICAID

## 2021-07-09 VITALS — WEIGHT: 186 LBS | BODY MASS INDEX: 25.19 KG/M2 | RESPIRATION RATE: 14 BRPM | HEIGHT: 72 IN | TEMPERATURE: 98.3 F

## 2021-07-09 DIAGNOSIS — M76.62 ACHILLES TENDINITIS OF BOTH LOWER EXTREMITIES: Primary | ICD-10-CM

## 2021-07-09 DIAGNOSIS — M92.60 HAGLUND'S DEFORMITY: ICD-10-CM

## 2021-07-09 DIAGNOSIS — M76.61 ACHILLES TENDINITIS OF BOTH LOWER EXTREMITIES: Primary | ICD-10-CM

## 2021-07-09 LAB
EKG ATRIAL RATE: 64 BPM
EKG P AXIS: 25 DEGREES
EKG P-R INTERVAL: 146 MS
EKG Q-T INTERVAL: 406 MS
EKG QRS DURATION: 100 MS
EKG QTC CALCULATION (BAZETT): 418 MS
EKG R AXIS: 35 DEGREES
EKG T AXIS: 66 DEGREES
EKG VENTRICULAR RATE: 64 BPM

## 2021-07-09 PROCEDURE — G8419 CALC BMI OUT NRM PARAM NOF/U: HCPCS | Performed by: ORTHOPAEDIC SURGERY

## 2021-07-09 PROCEDURE — 1036F TOBACCO NON-USER: CPT | Performed by: ORTHOPAEDIC SURGERY

## 2021-07-09 PROCEDURE — G8427 DOCREV CUR MEDS BY ELIG CLIN: HCPCS | Performed by: ORTHOPAEDIC SURGERY

## 2021-07-09 PROCEDURE — 99214 OFFICE O/P EST MOD 30 MIN: CPT | Performed by: ORTHOPAEDIC SURGERY

## 2021-07-09 PROCEDURE — 3017F COLORECTAL CA SCREEN DOC REV: CPT | Performed by: ORTHOPAEDIC SURGERY

## 2021-07-09 PROCEDURE — 97161 PT EVAL LOW COMPLEX 20 MIN: CPT

## 2021-07-09 PROCEDURE — 97116 GAIT TRAINING THERAPY: CPT

## 2021-07-09 NOTE — PROGRESS NOTES
[facility]  [facility]  [address] [place] [address]  Dept: [phone]    Ambulatory Orthopedic Consult      CHIEF COMPLAINT:    Chief Complaint   Patient presents with    Foot Pain     R Foot       HISTORY OF PRESENT ILLNESS:      The patient is a 50 y.o. male who is being seen for evaluation of pain at the above location at the posterior heel/Achilles tendon on the right greater than left. The pain is described mainly with mechanical terms (dull/sharp/throbbing). The pain is worse with activity and better with rest. The patient reports a progressive course. The patient has tried:      [x]  rest/activity modification          [x]  NSAIDs      []  opiates      []  orthotics        [x]  change in shoes   [x]  home exercises  [x]  physical therapy      [x]  CAM boot     []  brace:    []  injection:       []  surgery:      The patient is referred here today by Dr. Jyothi Lopez. INTERVAL HISTORY 6/24/2021:  He is seen again today in the office for follow up of imaging as below. Since being seen last, the patient is doing about the same overall. At today's visit, he is using no brace/assistive device. History is obtained today from:   [x]  the patient     [x]  EMR     []  one family member/friend    []  multiple family members/friends    []  other:      INTERVAL HISTORY 7/9/2021:  He is seen again today in the office for follow up of a previous issue (as above). Since being seen last, he reports the problem has not significantly improved. At today's visit, he is using a brace/boot. He is here today to discuss surgery, and reports that he wishes to proceed with surgery in the near future. He denies any other clinically significant changes in history.      History is obtained today from:   [x]  the patient     []  EMR     []  one family member/friend    [x]  multiple family members/friends --patient's mother and stepfather   []  other: REVIEW OF SYSTEMS:  Constitutional: Negative for fever. HENT: Negative for tinnitus. Eyes: Negative for pain. Respiratory: Negative for shortness of breath. Cardiovascular: Negative for chest pain. Gastrointestinal: Negative for abdominal pain. Genitourinary: Negative for dysuria. Skin: Negative for rash. Neurological: Negative for headaches. Hematological: Does not bruise/bleed easily. Musculoskeletal: See HPI for pertinent positives     Past Medical History:    He  has a past medical history of C. difficile diarrhea, Dizziness, Hyperlipidemia, Hyperthyroidism, Hypotension, Orthostatic hypotension, and Stage 3 chronic kidney disease ([facility] [address] Past Surgical History:    He  has a past surgical history that includes Throat surgery (9/17/1984); Upper gastrointestinal endoscopy (4-2-15); and Colonoscopy. Current Medications:     Current Outpatient Medications:     triamcinolone (KENALOG) 1.427 % cream, 1 APPLICATION ONCE A DAY FOR 10 DAYS, Disp: , Rfl:     midodrine (PROAMATINE) 10 MG tablet, take 1 tablet by mouth three times a day, Disp: 270 tablet, Rfl: 3    diclofenac sodium (VOLTAREN) 1 % GEL, Apply 4 g topically 4 times daily, Disp: 5 Tube, Rfl: 0    Elastic Bandages & Supports (ANKLE SPLINT/NIGHT AIRFORM) MISC, 1 Device by Does not apply route every evening Achilles tendinitis of both lower extremities  (primary encounter diagnosis) Kelsy's deformity, bilateral Equinus deformity  Dispense posterior night splint., Disp: 1 each, Rfl: 0    Cholecalciferol (VITAMIN D) 2000 units CAPS capsule, Take 1 capsule by mouth daily, Disp: , Rfl:     benztropine (COGENTIN) 2 MG tablet, Take 2 mg by mouth 3 times daily. , Disp: , Rfl:     fenofibrate (TRICOR) 145 MG tablet, Take 160 mg by mouth daily , Disp: , Rfl:     pravastatin (PRAVACHOL) 40 MG tablet, Take 40 mg by mouth daily. , Disp: , Rfl:     risperiDONE (RISPERDAL) 1 MG tablet, Take 1 mg by mouth daily , Disp: , Rfl: Allergies:    Ibuprofen and Pcn [penicillins]    Family History:  family history includes Cancer in his maternal grandfather; Diabetes in his maternal aunt and maternal grandmother; High Blood Pressure in his mother. Social History:   Social History     Occupational History    Not on file   Tobacco Use    Smoking status: Never Smoker    Smokeless tobacco: Never Used   Vaping Use    Vaping Use: Never used   Substance and Sexual Activity    Alcohol use: No    Drug use: No    Sexual activity: Not on file     Occupation: Works part-time as a [employer]out     OBJECTIVE:  Temp 98.3 °F (36.8 °C)   Resp 14   Ht 6' (1.829 m)   Wt 186 lb (84.4 kg)   BMI 25.23 kg/m²    Psych: alert and oriented to person, time, and place  Cardio:  well perfused extremities  Resp:  normal respiratory effort  Skin:  no cyanosis  Hem/lymph:  no lymphedema  Neuro:  sensation to light touch grossly intact throughout all nerve distributions in the foot   Musculoskeletal:    RLE:  Vascular: Toes warm and well perfused, compartments soft/compressible. No significant swelling of foot. Skin: Intact without rash/lesions/AV malformations. Strength: Able to fire/perform the following with appropriate strength:    [x]  Tib Ant:     [x]  Gastroc-Soleus:         [x]  Inversion:    [x]  Eversion:         [x]  FHL:     [x]  EHL:      Motion:  Normal for the following joints:    [x]  Ankle:      [x]  Subtalar:        [x]  1st MTP:      []  1st TMT:            Tenderness to Palpation:    Tenderness to palpation: Posterior heel with obvious deformity  -no nodules/thickening of Achilles tendon palpated  -no palpable gap of Achilles tendon noted  -no pain with resisted plantarflexion      LLE:  Vascular: Toes warm and well perfused, compartments soft/compressible. No significant swelling of foot. Skin: Intact without rash/lesions/AV malformations.   Strength: Able to fire/perform the following with appropriate strength:    [x]  Tib Ant:     [x] attachment. Xr Foot Right (min 3 Views)    Result Date: 4/22/2021  Studies of both ankles and both feet demonstrate no acute abnormality or degenerative change. Bilateral calcaneal enthesopathy at Achilles tendon attachment. ASSESSMENT AND PLAN:  Body mass index is 25.23 kg/m². He has right greater than left insertional Achilles tendinitis with posterior calcaneal enthesophytes and Kelsy's deformity. Notably, he has the past medical history as above. He has a history of paranoid schizophrenia per EMR, as well as stage III chronic kidney disease. He has previously seen a cardiologist.    We had a discussion today about the likely diagnosis and its natural history, physical exam and imaging findings, as well as various treatment options in detail. Surgically, we had a long discussion today about surgical and nonsurgical treatment options. In terms of surgery, we discussed the right Achilles tendon debridement with a Kelsy's excision. We again discussed the anticipated postoperative course and relevant weightbearing restrictions/immobilization. We did discuss his elevated risk of postoperative complications, given his past medical history as above. We did discuss the risks of wound healing issues, rerupture, infection, future surgeries, incomplete pain relief, and even limb loss. We discussed the importance of strictly adhering to the weightbearing restriction postoperatively. We did also discuss the actual impact that his pain is having on his life, and at this time, it does not appear that his pain is significantly affecting him, but rather intermittently will bother him. After a long thorough discussion, the patient and his family talked amongst themselves, and wished to proceed with nonoperative management for the time being. I do believe that this is in the patient's best interest, as he does not seem to be functionally limited by his pain at this time.     At today's visit, the patient's physical therapy and medical clearances were also reviewed, and he was deemed moderate risk, and the patient was also cleared by nephrology, and we discussed this. Orders/referrals were placed as below at today's visit. All questions were answered and the patient agrees with the above plan. The patient will return to clinic in the future as needed. At the patient's next visit, depending on how the patient is doing and/or new imaging/labs results, we may consider the following options:    []  Orthotic (OTC)     []  Orthotic (custom)          []  Rocker bottom shoes     []  Brace (OTC)        []  Brace (custom)             []  CAM boot        []  Night splint         []  Heel cups        []  Strap      []  Toe sleeves/splints    []  PT:                     []  Wean out of immobilization   []  Advance activity       []  Topical               []  NSAIDs          []  Ashley         []  Referral:         []  Stress xrays       []  CT         []  MRI        []  Injection:         []  Consider OR      []  Pick OR date    No follow-ups on file. No orders of the defined types were placed in this encounter. No orders of the defined types were placed in this encounter. Taryn Buckner MD  Orthopedic Surgery        Please excuse any typos/errors, as this note was created with the assistance of voice recognition software. While intending to generate a document that actually reflects the content of the visit, the document can still have some errors including those of syntax and sound-a-like substitutions which may escape proof reading. In such instances, actual meaning can be extrapolated by context.

## 2021-07-09 NOTE — CONSULTS
[] [facility]) - [facility] &  Therapy  [address]    [phone]  F: [phone]   [] [address]  [address]   [address]  P: [phone]  F: [phone]  [] [place]  [address]  P: [phone]  F: [phone]   [] [address]  P: [phone]  F: [phone]  [] [facility]  [address]  [place]: [phone]   F: [phone] [x] [facility]  [facility]rapy  [address]   [place]: [phone]  F: [phone]           Physical Therapy Evaluation    Date:  2021   Patient: Robyn Bella  : 1965  MRN: [id number]  Physician: Dr. Rachel Kasper: Julian Gomez Diagnosis: R foot pain   Rehab Codes: M79.671  Onset date: 21   Next Dr's appt.: surgery scheduled 7/15/21    Subjective:   CC/HPI: 51 y/o male presents for a DME evaluation. Patient has surgery scheduled for 7/15/21 right Achilles tendon debridement with Kelsy's excision. Patient denies acute injury. Patient lives with his parents in a 1 story home with 2 steps to get in. PMHx:     Past Medical History in prose (no negatives)[]Expand by Default    has a past medical history of Hyperthyroidism and Stage 3 chronic kidney disease ([facility] [address]    Past Surgical History:    He  has a past surgical history that includes Throat surgery (1984); Upper gastrointestinal endoscopy (4-2-15); and Colonoscopy. Tests: [] X-Ray:    [x] MRI:     2021 Prior images reviewed for reference.  MRI images and radiology report reviewed, as below:    Mild generalized thickening of the Achilles tendon without superimposed   tendinopathy or tear.       Nonspecific marrow edema within the calcaneus.       No ligament or tendon tear identified. [] Other:     Medications:  [x] Refer to full medical record [] None [] Other:  Allergies:       [x] Refer to full medical record [] None [] Other:      Martial Status Mom and stepdad   Home type No handrails on front porch    Stairs from outside 2 steps no handrails   Stairs inside None   Employement [employer] at [address] status On vacation this week, will be written off work after surgery    Work Activities/duties  Prolonged standing and walking        Pain present? No    Location R ankle    Pain Rating currently 0/10   Pain at worse 4/10 pain when stepping down barefoot   Pain at best    Description of pain    Altered Sensation Numbness up to his waist    What makes it worse Standing, stepping down    What makes it better    Pain altered treatment/action No          Objective:    ROM: WFL              Strength: WFL         Edema: No           Palpation/Pain: Patient with mild R ankle pain          Educated pt on use of DME, including: (Check box of device used)     [x] Knee Scooter: Instructed pt on appropriate height being even with contralateral knee. Reviewed safety concerns such as not gliding on turns and using breaks appropriately. [x] Rolling Walker: Instructed pt on appropriate height of even with wrists with arms at resting at side. Educated pt on safe gait pattern while using walker. Explained to pt the difference between a 4 wheeled walker and rolling walker and how a rolling walker is most appropriate for a NWB pt. [x] Axillary Crutches: Instructed pt on appropriate height of two finger width between crutch and axilla, as well as elbow flexion of approximately 20deg. Educated pt on how to adjust both crutch and handle height. [x] Stairs: Patient was educated on use of bilateral crutches to navigate his 2 steps to get inside his home.  Additionally, demonstration of backwards scooting was provided with patient able to demonstrate this while maintaining NWB on his RLE. Pt with good understanding of all techniques/uses and expressed no safety concerns. At this time pt will most benefit from use of: rolling walker for general mobility, crutches for stair navigation, and knee scooter for home mobility. Comments: Patient was able to walk with the rolling walker with ease while maintaining NWB on his RLE, with increased fatigue after approx 100 feet. Patient has two steps to enter his home, and would benefit from axillary crutches as he demonstrates safe use with SBA. Discussed with patient that he should have family nearby if he chooses this method, but was also instructed on backwards scooting. Assessment:  STG: (to be met in 1 treatments)  1. Educate patient on proper use of DME   2. Patient to perform transfers and weight bearing status independent without assistance   3. Independent with Home Exercise Programs  4. Patient to navigate 2 steps with or without AD safely        Rehab Potential:  [x] Good  [] Fair  [] Poor   Suggested Professional Referral:  [x] No  [] Yes:  Barriers to Goal Achievement[de-identified]  [] No  [] Yes:  Domestic Concerns:  [] No  [] Yes:    Pt. Education:  [x] Plans/Goals, Risks/Benefits discussed  [x] Home exercise program    Method of Education: [x] Verbal  [x] Demo  [x] Written    Access Code: [email or web address]  URL: [email or web address]  Date: 07/09/2021  Prepared by: Justyn Cai    Patient Education  Walking with a Front Wheel Walker - Non Wells Fadia Bearing  Sit to Stand with [employer] - Non Wells Fadia Bearing    Comprehension of Education:  [x] Irma Morton understanding. [x] Demonstrates understanding. [] Needs Review. [] Demonstrates/verbalizes understanding of HEP/Ed previously given.     Treatment Plan:  [] Therapeutic Exercise      [] Manual Therapy       [x] Instruction in HEP        [x] Neuromuscular Re-education     [] Vasocompression (Aliyah Roses)        [x] Gait PT        Physician Signature:________________________________Date:__________________  By signing above or cosigning this note, I have reviewed this plan of care and certify a need for medically necessary rehabilitation services.      *PLEASE SIGN ABOVE AND FAX BACK ALL PAGES*

## 2021-07-09 NOTE — TELEPHONE ENCOUNTER
Spoke with staff member to make sure that PAT labs/prelim EKG was received. They confirmed everything was received and they will send over clearance once results were reviewed.

## 2021-08-06 ENCOUNTER — OFFICE VISIT (OUTPATIENT)
Dept: CARDIOLOGY | Age: 56
End: 2021-08-06
Payer: MEDICAID

## 2021-08-06 VITALS
HEIGHT: 72 IN | BODY MASS INDEX: 25.19 KG/M2 | WEIGHT: 186 LBS | DIASTOLIC BLOOD PRESSURE: 74 MMHG | HEART RATE: 113 BPM | SYSTOLIC BLOOD PRESSURE: 115 MMHG

## 2021-08-06 DIAGNOSIS — R42 DIZZINESS: ICD-10-CM

## 2021-08-06 DIAGNOSIS — R07.9 CHEST PAIN, MODERATE CORONARY ARTERY RISK: Primary | ICD-10-CM

## 2021-08-06 DIAGNOSIS — E78.2 MIXED HYPERLIPIDEMIA: ICD-10-CM

## 2021-08-06 DIAGNOSIS — I95.1 ORTHOSTATIC HYPOTENSION: ICD-10-CM

## 2021-08-06 PROCEDURE — 99214 OFFICE O/P EST MOD 30 MIN: CPT | Performed by: INTERNAL MEDICINE

## 2021-08-06 PROCEDURE — G8419 CALC BMI OUT NRM PARAM NOF/U: HCPCS | Performed by: INTERNAL MEDICINE

## 2021-08-06 PROCEDURE — 3017F COLORECTAL CA SCREEN DOC REV: CPT | Performed by: INTERNAL MEDICINE

## 2021-08-06 PROCEDURE — 1036F TOBACCO NON-USER: CPT | Performed by: INTERNAL MEDICINE

## 2021-08-06 PROCEDURE — 99213 OFFICE O/P EST LOW 20 MIN: CPT | Performed by: INTERNAL MEDICINE

## 2021-08-06 PROCEDURE — G8427 DOCREV CUR MEDS BY ELIG CLIN: HCPCS | Performed by: INTERNAL MEDICINE

## 2021-08-06 NOTE — PROGRESS NOTES
Today's Date: 8/6/2021  Patient Name: Nano Abbott  Patient's age: 50 y. o., 1965    CC: follow up for dizziness    HPI: the patient is a 50 y.o.  male is in the office for dizziness. Recently has had some fatigue with exertion. Also at work, does some heavy lifting, then had some chest pain. States he felt a heaviness in his chest, for upto 30 minutes. Resolved with rest.   No sob, orthopnea, pnd, le edema, palpitations. Past Medical History:   has a past medical history of C. difficile diarrhea, Dizziness, Hyperlipidemia, Hyperthyroidism, Hypotension, Orthostatic hypotension, and Stage 3 chronic kidney disease ([facility] [address] Past Surgical History:   has a past surgical history that includes Throat surgery (9/17/1984); Upper gastrointestinal endoscopy (4-2-15); and Colonoscopy. Home Medications:    Prior to Admission medications    Medication Sig Start Date End Date Taking? Authorizing Provider   Ferrous Sulfate (IRON SUPPLEMENT PO) Take by mouth daily   Yes Historical Provider, MD   triamcinolone (KENALOG) 0.615 % cream 1 APPLICATION ONCE A DAY FOR 10 DAYS 12/9/20  Yes Historical Provider, MD   midodrine (PROAMATINE) 10 MG tablet take 1 tablet by mouth three times a day 11/16/20  Yes Antony Cutler DO   diclofenac sodium (VOLTAREN) 1 % GEL Apply 4 g topically 4 times daily 9/24/20 8/6/21 Yes Natalia Martell DPM   Elastic Bandages & Supports (ANKLE SPLINT/NIGHT AIRFORM) MISC 1 Device by Does not apply route every evening Achilles tendinitis of both lower extremities  (primary encounter diagnosis)  Kelsy's deformity, bilateral  Equinus deformity    Dispense posterior night splint. 9/3/20  Yes Natalia Martell DPM   Cholecalciferol (VITAMIN D) 2000 units CAPS capsule Take 1 capsule by mouth daily   Yes Historical Provider, MD   benztropine (COGENTIN) 2 MG tablet Take 2 mg by mouth 3 times daily.    Yes Historical Provider, MD   fenofibrate (TRICOR) 145 MG tablet Take 160 mg by mouth daily    Yes Historical Provider, MD   pravastatin (PRAVACHOL) 40 MG tablet Take 40 mg by mouth daily. Yes Historical Provider, MD   risperiDONE (RISPERDAL) 1 MG tablet Take 1 mg by mouth daily    Yes Historical Provider, MD       Allergies:  Ibuprofen and Pcn [penicillins]    Social History:   reports that he has never smoked. He has never used smokeless tobacco. He reports that he does not drink alcohol and does not use drugs. Family History:    Family History   Problem Relation Age of Onset    High Blood Pressure Mother     Diabetes Maternal Aunt     Diabetes Maternal Grandmother     Cancer Maternal Grandfather         lung cancer     REVIEW OF SYSTEMS:   · Constitutional: there has been no unanticipated weight loss. There's been No change in energy level, No change in activity level. · Eyes: No visual changes or diplopia. No scleral icterus. · ENT: No Headaches, hearing loss or vertigo. No mouth sores or sore throat. · Cardiovascular: AS HPI  · Respiratory: AS HPI  · Gastrointestinal: No abdominal pain, appetite loss, blood in stools. No change in bowel or bladder habits. · Genitourinary: No dysuria, trouble voiding, or hematuria. · Musculoskeletal:  No gait disturbance, No weakness or joint complaints. · Integumentary: No rash or pruritis. · Neurological: No headache, diplopia, change in muscle strength, numbness or tingling. No change in gait, balance, coordination, mood, affect, memory, mentation, behavior. · Psychiatric: No new anxiety or depression. · Endocrine: No temperature intolerance. No excessive thirst, fluid intake, or urination. No tremor. · Hematologic/Lymphatic: No abnormal bruising or bleeding, blood clots or swollen lymph nodes. · Allergic/Immunologic: No nasal congestion or hives. PHYSICAL EXAM:      /74   Pulse 113   Ht 6' (1.829 m)   Wt 186 lb (84.4 kg)   BMI 25.23 kg/m²    HEENT: PERRL, no cervical lymphadenopathy.  No masses palpable. Cardiovascular:  · The apical impulse is not displaced  · Heart  Sounds:RRR, NO S3/RUB  · Jugular venous pulsation Normal  · The carotid upstroke is normal  · Peripheral pulses are symmetrical and full  Respiratory: Good respiratory effort. On auscultation: clear to auscultation bilaterally  Abdomen:  · No masses or tenderness  · Bowel sounds present  Extremities:  ·  No Cyanosis or Clubbing  ·  Lower extremity edema: No  Skin: Warm and dry    Cardiac data:    EKG: Normal sinus rhythm  Minimal voltage criteria for LVH, may be normal variant  Nonspecific T wave abnormality  Abnormal ECG    Nuclear stress 7/19: IMPRESSION:  1. No ischemia or infarction. 2. Normal LV systolic function. 3. GI uptake noted.       Labs:   No results found for: CHOL, TRIG, HDL, LDLCHOLESTEROL, LDLCALC, LABVLDL, VLDL, CHOLHDLRATIO    Lab Results   Component Value Date     07/08/2021    K 4.9 07/08/2021     07/08/2021    CO2 20 07/08/2021    BUN 34 (H) 07/08/2021    CREATININE 2.89 (H) 07/08/2021    GLUCOSE 88 07/08/2021    CALCIUM 9.8 07/08/2021    LABGLOM 23 (L) 07/08/2021    GFRAA 28 (L) 07/08/2021     Assessment:    -Chest pain, concern for angina  -Easy fatiguability   -Dizziness. H/O Orthostatic hypotension.   -Stress test 7/2019 No ischemia EF normal  -CKD    Recommendations:  - I had a long discussion with the patient, I am concern about his chest pain, I offered him a cath or stress, he prefers stress testing first.   - will check Lexiscan stress test to rule out ischemia/further risk stratify   - will check 2d Echo to eval for structural heart disease  - continue midodrine    The patient is to continue heart healthy diet, weight loss and exercise as tolerated. Patient's medications and side effects were discussed. Medication refills were provided if needed. Follow up appointment timing was discussed. All questions and concerns were addressed to patient's satisfaction.      The patient is to follow up in 6 months or sooner if necessary. Thank you for allowing me to participate in the care of this patient, please do not hesitate to call if you have any questions. Tayo Malik DO, [facility], [address], [address], [place] [address] [facility]  [email or web address] [employer]  [phone]

## 2021-08-10 ENCOUNTER — HOSPITAL ENCOUNTER (OUTPATIENT)
Dept: NON INVASIVE DIAGNOSTICS | Age: 56
Discharge: HOME | End: 2021-08-10
Payer: MEDICAID

## 2021-08-10 DIAGNOSIS — R07.9 CHEST PAIN, MODERATE CORONARY ARTERY RISK: ICD-10-CM

## 2021-08-10 LAB
LV EF: 60 %
LVEF MODALITY: NORMAL

## 2021-08-10 PROCEDURE — 93306 TTE W/DOPPLER COMPLETE: CPT

## 2021-08-31 ENCOUNTER — HOSPITAL ENCOUNTER (OUTPATIENT)
Dept: NUCLEAR MEDICINE | Age: 56
End: 2021-08-31
Payer: MEDICAID

## 2021-08-31 ENCOUNTER — HOSPITAL ENCOUNTER (OUTPATIENT)
Dept: NON INVASIVE DIAGNOSTICS | Age: 56
Discharge: HOME | End: 2021-08-31
Payer: MEDICAID

## 2021-08-31 ENCOUNTER — HOSPITAL ENCOUNTER (OUTPATIENT)
Dept: NUCLEAR MEDICINE | Age: 56
Discharge: HOME | End: 2021-09-02
Payer: MEDICAID

## 2021-08-31 DIAGNOSIS — R07.9 CHEST PAIN, MODERATE CORONARY ARTERY RISK: ICD-10-CM

## 2021-08-31 PROCEDURE — 93017 CV STRESS TEST TRACING ONLY: CPT

## 2021-08-31 PROCEDURE — 3430000000 HC RX DIAGNOSTIC RADIOPHARMACEUTICAL: Performed by: INTERNAL MEDICINE

## 2021-08-31 PROCEDURE — 78452 HT MUSCLE IMAGE SPECT MULT: CPT

## 2021-08-31 PROCEDURE — A9500 TC99M SESTAMIBI: HCPCS | Performed by: INTERNAL MEDICINE

## 2021-08-31 PROCEDURE — 93018 CV STRESS TEST I&R ONLY: CPT | Performed by: INTERNAL MEDICINE

## 2021-08-31 PROCEDURE — 6360000002 HC RX W HCPCS: Performed by: INTERNAL MEDICINE

## 2021-08-31 RX ADMIN — TETRAKIS(2-METHOXYISOBUTYLISOCYANIDE)COPPER(I) TETRAFLUOROBORATE 30 MILLICURIE: 1 INJECTION, POWDER, LYOPHILIZED, FOR SOLUTION INTRAVENOUS at 14:46

## 2021-08-31 RX ADMIN — TETRAKIS(2-METHOXYISOBUTYLISOCYANIDE)COPPER(I) TETRAFLUOROBORATE 10 MILLICURIE: 1 INJECTION, POWDER, LYOPHILIZED, FOR SOLUTION INTRAVENOUS at 14:45

## 2021-08-31 RX ADMIN — REGADENOSON 0.4 MG: 0.08 INJECTION, SOLUTION INTRAVENOUS at 13:49

## 2021-08-31 NOTE — PROGRESS NOTES
Patient Name:  Blake Ray MRN:  [id number]   :  1965  Age:  50 y.o. Sex: male   Ordering Provider: Cristóbal Arredondo DO   Primary Care Provider:  BABAK Chance CNP     Indications: Chest pain     Medications:     Current Outpatient Medications:     Ferrous Sulfate (IRON SUPPLEMENT PO), Take by mouth daily, Disp: , Rfl:     triamcinolone (KENALOG) 9.631 % cream, 1 APPLICATION ONCE A DAY FOR 10 DAYS, Disp: , Rfl:     midodrine (PROAMATINE) 10 MG tablet, take 1 tablet by mouth three times a day, Disp: 270 tablet, Rfl: 3    diclofenac sodium (VOLTAREN) 1 % GEL, Apply 4 g topically 4 times daily, Disp: 5 Tube, Rfl: 0    Elastic Bandages & Supports (ANKLE SPLINT/NIGHT AIRFORM) MISC, 1 Device by Does not apply route every evening Achilles tendinitis of both lower extremities  (primary encounter diagnosis) Kelsy's deformity, bilateral Equinus deformity  Dispense posterior night splint., Disp: 1 each, Rfl: 0    Cholecalciferol (VITAMIN D) 2000 units CAPS capsule, Take 1 capsule by mouth daily, Disp: , Rfl:     benztropine (COGENTIN) 2 MG tablet, Take 2 mg by mouth 3 times daily. , Disp: , Rfl:     fenofibrate (TRICOR) 145 MG tablet, Take 160 mg by mouth daily , Disp: , Rfl:     pravastatin (PRAVACHOL) 40 MG tablet, Take 40 mg by mouth daily. , Disp: , Rfl:     risperiDONE (RISPERDAL) 1 MG tablet, Take 1 mg by mouth daily , Disp: , Rfl:     Current Facility-Administered Medications:     regadenoson (LEXISCAN) injection 0.4 mg, 0.4 mg, IntraVENous, Once, Amos Cutler DO    Facility-Administered Medications Ordered in Other Encounters:     technetium sestamibi (CARDIOLITE) injection 30 millicurie, 30 millicurie, IntraVENous, ONCE PRN, Antony Cutler DO    technetium sestamibi (CARDIOLITE) injection 10 millicurie, 10 millicurie, IntraVENous, ONCE PRN, Antony Cutler,       ----------------------------------------------------------------------------------------------------------                Lexiscan 0.4 mg injected over 10 seconds. IV Cardiolite injected 20 seconds post Lexiscan injection. Heart Rate:  62  Resting Blood Pressure:  112/75   ----------------------------------------------------------------------------------------------------------     HR BP   1 min 104 106/57   2 min     3 min 93 105/60   4 min     5 min 90 105/59   6 min     7 min 80 104/57   8 min     9 min 81 107/67   10 min       Symptoms:  Chest Pain:  Yes      Nausea:  Yes     Headache:  No    Shortness of Breath:  Yes     Other:  No      Electronically signed by Vera Merrill RN on 8/31/21 at 1:41 PM EDT    ----------------------------------------------------------------------------------------------------------  Resting EKG:  NSR     Arrhythmias:  None     EKG Changes:  None     Maximum Changes:  None     Leads with maximum changes:  None     EKG returned to baseline 0-1 minutes in recovery. Interpretation:    1. Normal ECG portion of stress test.  2. Nuclear perfusion scan report to follow.           Supervising Physician:  Ifeanyi Gongora DO

## 2021-09-02 NOTE — PROCEDURES
[facility] [address]                 [address] [place] [id number]                              CARDIAC STRESS TEST    PATIENT NAME: Sylvie Ro                    :        1965  MED REC NO:   [id number]                             ROOM:  ACCOUNT NO:   [de-identified]                           ADMIT DATE: 2021  PROVIDER:     Kay Alvarenga    DATE OF STUDY:  2021    STRESS TEST    Ordering Provider:  Dejuan Stuart DO    Primary Care Provider:  MARY Mccarty    Patient's Age: 50     Height: 6 feet   Weight: 186 pounds    INDICATION:  Chest pain, abnormal EKG. Lexiscan 0.4 mg injected over 10 seconds. IV Cardiolite injected 20 seconds post Lexiscan injection. Heart Rate: 62 Resting blood pressure:  112/75              HR   BP  1 min          104  106/57  2 min  3 min          93   105/60  4 min  5 min          90   105/59  6 min  7 min          80   104/57  8 min  9 min          81   107/67  10 min    Symptoms:  Chest Pain: Yes  Nausea: Yes  Headache:  No  Shortness of  breath: Yes  Other: No    Resting EKG:  Normal sinus rhythm. Arrhythmias:  None. EKG changes:  None. Maximum changes:  None. Leads with maximum changes:  None. EKG returned to baseline 0-1 minutes in recovery. INTERPRETATION:  1. Negative ECG portion for ischemia. 2.  Nuclear perfusion scan report to follow.     Nuclear Myocardial Perfusion Imaging (SPECT)    TESTING METHOD  STRESS:   Lexiscan  AGENT:    Cardiolite  REST:          Injection Date:  2021  Time:  1315  Amount:  11.4  mCi  STRESS:   Injection Date:  2021  Time:  1400  Amount:  33.1 mCi  IMAGE TIME:    Rest:  1345  Stress:  1440    EF:  71%  TID:  0.86  LHR:  0.26    FINDINGS:  Ischemia (Reversible Defect):           No  Infarction (Irreversible Defect):       No  Ejection fraction Calculated:           Yes  Segmental wall motion:                  Yes    Low risk

## 2021-12-02 DIAGNOSIS — R42 DIZZINESS: ICD-10-CM

## 2021-12-03 RX ORDER — MIDODRINE HYDROCHLORIDE 10 MG/1
TABLET ORAL
Qty: 270 TABLET | Refills: 3 | Status: SHIPPED | OUTPATIENT
Start: 2021-12-03

## 2022-02-11 ENCOUNTER — OFFICE VISIT (OUTPATIENT)
Dept: CARDIOLOGY | Age: 57
End: 2022-02-11
Payer: MEDICAID

## 2022-02-11 VITALS
HEIGHT: 72 IN | WEIGHT: 190 LBS | DIASTOLIC BLOOD PRESSURE: 67 MMHG | SYSTOLIC BLOOD PRESSURE: 102 MMHG | HEART RATE: 94 BPM | BODY MASS INDEX: 25.73 KG/M2

## 2022-02-11 DIAGNOSIS — I95.1 ORTHOSTATIC HYPOTENSION: Primary | ICD-10-CM

## 2022-02-11 PROCEDURE — G8419 CALC BMI OUT NRM PARAM NOF/U: HCPCS | Performed by: INTERNAL MEDICINE

## 2022-02-11 PROCEDURE — G8427 DOCREV CUR MEDS BY ELIG CLIN: HCPCS | Performed by: INTERNAL MEDICINE

## 2022-02-11 PROCEDURE — 99213 OFFICE O/P EST LOW 20 MIN: CPT | Performed by: INTERNAL MEDICINE

## 2022-02-11 PROCEDURE — 93010 ELECTROCARDIOGRAM REPORT: CPT | Performed by: INTERNAL MEDICINE

## 2022-02-11 PROCEDURE — 1036F TOBACCO NON-USER: CPT | Performed by: INTERNAL MEDICINE

## 2022-02-11 PROCEDURE — 3017F COLORECTAL CA SCREEN DOC REV: CPT | Performed by: INTERNAL MEDICINE

## 2022-02-11 PROCEDURE — G8484 FLU IMMUNIZE NO ADMIN: HCPCS | Performed by: INTERNAL MEDICINE

## 2022-02-11 PROCEDURE — 93005 ELECTROCARDIOGRAM TRACING: CPT | Performed by: INTERNAL MEDICINE

## 2022-02-11 ASSESSMENT — ENCOUNTER SYMPTOMS
ABDOMINAL PAIN: 0
EYE DISCHARGE: 0
CHEST TIGHTNESS: 0
ABDOMINAL DISTENTION: 0
EYE ITCHING: 0
SHORTNESS OF BREATH: 0

## 2022-02-11 NOTE — PROGRESS NOTES
Today's Date: 2/11/2022  Patient's Name: Christie Addison  Patient's age: 49 y.o., 1965    Subjective: The patient is a 49y.o. year old, , male is in the office for follow up  Denies exertional chest pain or SOB, does feel dizzy, no syncope.          Past Medical History:   has a past medical history of C. difficile diarrhea, Dizziness, Hyperlipidemia, Hyperthyroidism, Hypotension, Orthostatic hypotension, and Stage 3 chronic kidney disease ([facility] [address] Past Surgical History:   has a past surgical history that includes Throat surgery (9/17/1984); Upper gastrointestinal endoscopy (4-2-15); and Colonoscopy. Home Medications:  Prior to Admission medications    Medication Sig Start Date End Date Taking? Authorizing Provider   midodrine (PROAMATINE) 10 MG tablet take 1 tablet by mouth three times a day 12/3/21  Yes Slade Chang MD   Ferrous Sulfate (IRON SUPPLEMENT PO) Take by mouth daily   Yes Historical Provider, MD   Cholecalciferol (VITAMIN D) 2000 units CAPS capsule Take 1 capsule by mouth daily   Yes Historical Provider, MD   benztropine (COGENTIN) 2 MG tablet Take 2 mg by mouth 3 times daily. Yes Historical Provider, MD   fenofibrate (TRICOR) 145 MG tablet Take 160 mg by mouth daily    Yes Historical Provider, MD   pravastatin (PRAVACHOL) 40 MG tablet Take 40 mg by mouth daily. Yes Historical Provider, MD   risperiDONE (RISPERDAL) 1 MG tablet Take 1 mg by mouth daily    Yes Historical Provider, MD   diclofenac sodium (VOLTAREN) 1 % GEL Apply 4 g topically 4 times daily 9/24/20 8/6/21  Rochelle Rodriguez DPM   Elastic Bandages & Supports (ANKLE SPLINT/NIGHT AIRFORM) MIS 1 Device by Does not apply route every evening Achilles tendinitis of both lower extremities  (primary encounter diagnosis)  Kelsy's deformity, bilateral  Equinus deformity    Dispense posterior night splint.  9/3/20   Rochelle Rodriguez DPM       Allergies:  Ibuprofen and Pcn [penicillins]    Social History:   reports that he has never smoked. He has never used smokeless tobacco. He reports that he does not drink alcohol and does not use drugs. Review of Systems:  Review of Systems   Constitutional: Negative for appetite change, fatigue and fever. HENT: Negative for congestion and dental problem. Eyes: Negative for discharge and itching. Respiratory: Negative for chest tightness and shortness of breath. Cardiovascular: Negative for chest pain and palpitations. Gastrointestinal: Negative for abdominal distention and abdominal pain. Endocrine: Negative for cold intolerance and heat intolerance. Neurological: Negative for syncope and facial asymmetry. Psychiatric/Behavioral: Negative for agitation and behavioral problems. Physical Exam:  /67 (Site: Right Upper Arm, Position: Supine, Cuff Size: Medium Adult)   Pulse 94   Ht 6' (1.829 m)   Wt 190 lb (86.2 kg)   BMI 25.77 kg/m²    Physical Exam  Constitutional:       Appearance: Normal appearance. HENT:      Head: Normocephalic and atraumatic. Nose: Nose normal.      Mouth/Throat:      Mouth: Mucous membranes are moist.   Cardiovascular:      Rate and Rhythm: Normal rate and regular rhythm. Pulses: Normal pulses. Heart sounds: Normal heart sounds. No murmur heard. Pulmonary:      Effort: Pulmonary effort is normal. No respiratory distress. Breath sounds: Normal breath sounds. No stridor. Abdominal:      General: There is no distension. Palpations: There is no mass. Musculoskeletal:         General: No swelling or tenderness. Cervical back: No rigidity or tenderness. Skin:     Capillary Refill: Capillary refill takes less than 2 seconds. Coloration: Skin is not jaundiced or pale. Neurological:      General: No focal deficit present. Mental Status: He is alert.    Psychiatric:         Mood and Affect: Mood normal.         Cardiac Data:      Labs:     CBC: No results for input(s): WBC, HGB, HCT, PLT

## 2022-09-09 ENCOUNTER — OFFICE VISIT (OUTPATIENT)
Dept: CARDIOLOGY | Age: 57
End: 2022-09-09
Payer: MEDICAID

## 2022-09-09 VITALS
WEIGHT: 189 LBS | HEIGHT: 72 IN | DIASTOLIC BLOOD PRESSURE: 50 MMHG | BODY MASS INDEX: 25.6 KG/M2 | SYSTOLIC BLOOD PRESSURE: 82 MMHG | HEART RATE: 70 BPM

## 2022-09-09 DIAGNOSIS — I95.1 ORTHOSTATIC HYPOTENSION: Primary | ICD-10-CM

## 2022-09-09 PROCEDURE — 3017F COLORECTAL CA SCREEN DOC REV: CPT | Performed by: INTERNAL MEDICINE

## 2022-09-09 PROCEDURE — 93010 ELECTROCARDIOGRAM REPORT: CPT | Performed by: INTERNAL MEDICINE

## 2022-09-09 PROCEDURE — 1036F TOBACCO NON-USER: CPT | Performed by: INTERNAL MEDICINE

## 2022-09-09 PROCEDURE — G8419 CALC BMI OUT NRM PARAM NOF/U: HCPCS | Performed by: INTERNAL MEDICINE

## 2022-09-09 PROCEDURE — 99212 OFFICE O/P EST SF 10 MIN: CPT | Performed by: INTERNAL MEDICINE

## 2022-09-09 PROCEDURE — 99213 OFFICE O/P EST LOW 20 MIN: CPT | Performed by: INTERNAL MEDICINE

## 2022-09-09 PROCEDURE — G8427 DOCREV CUR MEDS BY ELIG CLIN: HCPCS | Performed by: INTERNAL MEDICINE

## 2022-09-09 PROCEDURE — 93005 ELECTROCARDIOGRAM TRACING: CPT | Performed by: INTERNAL MEDICINE

## 2022-09-09 ASSESSMENT — ENCOUNTER SYMPTOMS
VOMITING: 0
BACK PAIN: 0
NAUSEA: 0
ABDOMINAL PAIN: 0
EYE ITCHING: 0
EYE DISCHARGE: 0
ABDOMINAL DISTENTION: 0

## 2022-09-09 NOTE — PROGRESS NOTES
Today's Date: 9/9/2022  Patient's Name: Surinder Escobar  Patient's age: 49 y.o., 1965    Subjective: The patient is a 49y.o. year old, , male is in the office for follow up  Denies exertional chest pain or SOB, does feel dizzy, no syncope. Past Medical History:   has a past medical history of C. difficile diarrhea, Dizziness, Hyperlipidemia, Hyperthyroidism, Hypotension, Orthostatic hypotension, and Stage 3 chronic kidney disease ([facility] [address] Past Surgical History:   has a past surgical history that includes Throat surgery (9/17/1984); Upper gastrointestinal endoscopy (4-2-15); and Colonoscopy. Home Medications:  Prior to Admission medications    Medication Sig Start Date End Date Taking? Authorizing Provider   midodrine (PROAMATINE) 10 MG tablet take 1 tablet by mouth three times a day 12/3/21  Yes Sandy Morse MD   Ferrous Sulfate (IRON SUPPLEMENT PO) Take by mouth daily   Yes Historical Provider, MD   Cholecalciferol (VITAMIN D) 2000 units CAPS capsule Take 1 capsule by mouth daily   Yes Historical Provider, MD   benztropine (COGENTIN) 2 MG tablet Take 2 mg by mouth 3 times daily. Yes Historical Provider, MD   fenofibrate (TRICOR) 145 MG tablet Take 160 mg by mouth daily    Yes Historical Provider, MD   pravastatin (PRAVACHOL) 40 MG tablet Take 40 mg by mouth daily. Yes Historical Provider, MD   risperiDONE (RISPERDAL) 1 MG tablet Take 1 mg by mouth daily    Yes Historical Provider, MD   Elastic Bandages & Supports (ANKLE SPLINT/NIGHT AIRFORM) MISC 1 Device by Does not apply route every evening Achilles tendinitis of both lower extremities  (primary encounter diagnosis)  Kelsy's deformity, bilateral  Equinus deformity    Dispense posterior night splint. 9/3/20   [facility]       Allergies:  Ibuprofen and Pcn [penicillins]    Social History:   reports that he has never smoked.  He has never used smokeless tobacco. He reports that he does not drink alcohol and does not use drugs. Review of Systems:  Review of Systems   Constitutional:  Negative for chills, fatigue and fever. HENT:  Negative for congestion and dental problem. Eyes:  Negative for discharge and itching. Cardiovascular:  Negative for chest pain and palpitations. Gastrointestinal:  Negative for abdominal distention, abdominal pain, nausea and vomiting. Endocrine: Negative for cold intolerance and heat intolerance. Musculoskeletal:  Negative for arthralgias and back pain. Neurological:  Negative for dizziness and syncope. Psychiatric/Behavioral:  Negative for agitation and behavioral problems. Physical Exam:  BP (!) 72/56 (Site: Right Upper Arm, Position: Standing, Cuff Size: Medium Adult)   Pulse 70 Comment: supine  Ht 6' (1.829 m)   Wt 189 lb (85.7 kg)   BMI 25.63 kg/m²    Physical Exam  Constitutional:       Appearance: Normal appearance. HENT:      Head: Normocephalic and atraumatic. Right Ear: Tympanic membrane normal.      Nose: Nose normal.   Cardiovascular:      Rate and Rhythm: Normal rate and regular rhythm. Pulses: Normal pulses. Heart sounds: Normal heart sounds. No murmur heard. Pulmonary:      Effort: Pulmonary effort is normal. No respiratory distress. Breath sounds: Normal breath sounds. No stridor. Abdominal:      General: There is no distension. Palpations: There is no mass. Musculoskeletal:         General: No swelling or tenderness. Cervical back: No rigidity or tenderness. Skin:     Capillary Refill: Capillary refill takes less than 2 seconds. Coloration: Skin is not jaundiced or pale. Neurological:      General: No focal deficit present. Mental Status: He is alert and oriented to person, place, and time. Psychiatric:         Mood and Affect: Mood normal.         Behavior: Behavior normal.       Cardiac Data:      Labs:     CBC: No results for input(s): WBC, HGB, HCT, PLT in the last 72 hours.   BMP:No results for input(s): NA, K, CO2, BUN, CREATININE, LABGLOM, GLUCOSE in the last 72 hours. PT/INR: No results for input(s): PROTIME, INR in the last 72 hours. FASTING LIPID PANEL:No results found for: HDL, LDLDIRECT, LDLCALC, TRIG  LIVER PROFILE:No results for input(s): AST, ALT, LABALBU in the last 72 hours. IMPRESSION:    There is no problem list on file for this patient. Assessment/Plan:  1. Dizziness. H/O Orthostatic hypotension. Echo 9/2017 EF 55-60%, Grade I DD, no significant valvular disease. Feels better on Midodrine. BP checked by me and was 100/60 with negative orthostatics. Encouraged to stay well hydrated and use compression stockings. 2. Stress test 7/2019 No ischemia EF normal. Echo 8/2021 EF 60%. Normal valves. 2. CKD. Followed by nephrology.          Srini Adams MD, MD  [facility] Cardiology Consult           [phone]

## 2022-12-02 ENCOUNTER — OFFICE VISIT (OUTPATIENT)
Dept: CARDIOLOGY | Age: 57
End: 2022-12-02
Payer: MEDICAID

## 2022-12-02 VITALS
SYSTOLIC BLOOD PRESSURE: 100 MMHG | DIASTOLIC BLOOD PRESSURE: 68 MMHG | WEIGHT: 188 LBS | BODY MASS INDEX: 25.47 KG/M2 | HEART RATE: 81 BPM | HEIGHT: 72 IN

## 2022-12-02 DIAGNOSIS — I95.1 ORTHOSTATIC HYPOTENSION: Primary | ICD-10-CM

## 2022-12-02 PROCEDURE — G8419 CALC BMI OUT NRM PARAM NOF/U: HCPCS | Performed by: INTERNAL MEDICINE

## 2022-12-02 PROCEDURE — 1036F TOBACCO NON-USER: CPT | Performed by: INTERNAL MEDICINE

## 2022-12-02 PROCEDURE — 99212 OFFICE O/P EST SF 10 MIN: CPT | Performed by: INTERNAL MEDICINE

## 2022-12-02 PROCEDURE — 3017F COLORECTAL CA SCREEN DOC REV: CPT | Performed by: INTERNAL MEDICINE

## 2022-12-02 PROCEDURE — 99213 OFFICE O/P EST LOW 20 MIN: CPT | Performed by: INTERNAL MEDICINE

## 2022-12-02 PROCEDURE — G8484 FLU IMMUNIZE NO ADMIN: HCPCS | Performed by: INTERNAL MEDICINE

## 2022-12-02 PROCEDURE — 93005 ELECTROCARDIOGRAM TRACING: CPT | Performed by: INTERNAL MEDICINE

## 2022-12-02 PROCEDURE — G8427 DOCREV CUR MEDS BY ELIG CLIN: HCPCS | Performed by: INTERNAL MEDICINE

## 2022-12-02 PROCEDURE — 93010 ELECTROCARDIOGRAM REPORT: CPT | Performed by: INTERNAL MEDICINE

## 2022-12-02 ASSESSMENT — ENCOUNTER SYMPTOMS
ABDOMINAL DISTENTION: 0
COLOR CHANGE: 0
ABDOMINAL PAIN: 0
BACK PAIN: 0
APNEA: 0
CHEST TIGHTNESS: 0

## 2022-12-02 NOTE — PROGRESS NOTES
Today's Date: 12/2/2022  Patient's Name: Amalia Butler  Patient's age: 49 y.o., 1965    Subjective: The patient is a 49y.o. year old, , male is in the office for follow up  Denies exertional chest pain or SOB, does feel dizzy, no syncope. Past Medical History:   has a past medical history of C. difficile diarrhea, Dizziness, Hyperlipidemia, Hyperthyroidism, Hypotension, Orthostatic hypotension, and Stage 3 chronic kidney disease ([facility] [address] Past Surgical History:   has a past surgical history that includes Throat surgery (9/17/1984); Upper gastrointestinal endoscopy (4-2-15); and Colonoscopy. Home Medications:  Prior to Admission medications    Medication Sig Start Date End Date Taking? Authorizing Provider   midodrine (PROAMATINE) 10 MG tablet take 1 tablet by mouth three times a day 12/3/21  Yes Orin Balderrama MD   Ferrous Sulfate (IRON SUPPLEMENT PO) Take by mouth daily   Yes Historical Provider, MD   Cholecalciferol (VITAMIN D) 2000 units CAPS capsule Take 1 capsule by mouth daily   Yes Historical Provider, MD   benztropine (COGENTIN) 2 MG tablet Take 2 mg by mouth 3 times daily. Yes Historical Provider, MD   fenofibrate (TRICOR) 145 MG tablet Take 160 mg by mouth daily    Yes Historical Provider, MD   pravastatin (PRAVACHOL) 40 MG tablet Take 40 mg by mouth daily. Yes Historical Provider, MD   risperiDONE (RISPERDAL) 1 MG tablet Take 1 mg by mouth daily    Yes Historical Provider, MD   Elastic Bandages & Supports (ANKLE SPLINT/NIGHT AIRFORM) MISC 1 Device by Does not apply route every evening Achilles tendinitis of both lower extremities  (primary encounter diagnosis)  Kelsy's deformity, bilateral  Equinus deformity    Dispense posterior night splint. 9/3/20   Mendel Malay, DPM       Allergies:  Ibuprofen and Pcn [penicillins]    Social History:   reports that he has never smoked.  He has never used smokeless tobacco. He reports that he does not drink alcohol and does not use drugs. Review of Systems:  Review of Systems   Constitutional:  Negative for chills, fatigue and fever. HENT:  Negative for congestion and dental problem. Respiratory:  Negative for apnea and chest tightness. Cardiovascular:  Negative for chest pain and palpitations. Gastrointestinal:  Negative for abdominal distention and abdominal pain. Endocrine: Negative for cold intolerance. Musculoskeletal:  Negative for arthralgias and back pain. Skin:  Negative for color change and pallor. Neurological:  Negative for dizziness and facial asymmetry. Physical Exam:  /68 (Site: Left Upper Arm, Position: Sitting, Cuff Size: Large Adult)   Pulse 81   Ht 6' (1.829 m)   Wt 188 lb (85.3 kg)   BMI 25.50 kg/m²    Physical Exam  Constitutional:       Appearance: Normal appearance. HENT:      Head: Normocephalic and atraumatic. Nose: Nose normal.   Cardiovascular:      Rate and Rhythm: Normal rate and regular rhythm. Pulses: Normal pulses. Heart sounds: Normal heart sounds. No murmur heard. Abdominal:      General: There is no distension. Palpations: There is no mass. Musculoskeletal:         General: No swelling or tenderness. Skin:     Capillary Refill: Capillary refill takes less than 2 seconds. Coloration: Skin is not jaundiced or pale. Neurological:      General: No focal deficit present. Mental Status: He is alert and oriented to person, place, and time. Psychiatric:         Mood and Affect: Mood normal.         Behavior: Behavior normal.       Cardiac Data:  EKG:     Labs:     CBC: No results for input(s): WBC, HGB, HCT, PLT in the last 72 hours. BMP:No results for input(s): NA, K, CO2, BUN, CREATININE, LABGLOM, GLUCOSE in the last 72 hours. PT/INR: No results for input(s): PROTIME, INR in the last 72 hours.   FASTING LIPID PANEL:No results found for: HDL, LDLDIRECT, LDLCALC, TRIG  LIVER PROFILE:No results for input(s): AST, ALT, LABALBU in the last 72 hours. IMPRESSION:    There is no problem list on file for this patient. Assessment/Plan:  1. Dizziness. H/O Orthostatic hypotension. Echo 9/2017 EF 55-60%, Grade I DD, no significant valvular disease. Feels better on Midodrine. Encouraged to stay well hydrated and use compression stockings. 2. Stress test 7/2019 No ischemia EF normal. Echo 8/2021 EF 60%. Normal valves. 2. CKD. Followed by nephrology.          Edison Frankel, MD, MD  [facility] Cardiology Consult           [phone]

## 2022-12-19 DIAGNOSIS — R42 DIZZINESS: ICD-10-CM

## 2022-12-19 RX ORDER — MIDODRINE HYDROCHLORIDE 10 MG/1
10 TABLET ORAL 3 TIMES DAILY
Qty: 270 TABLET | Refills: 3 | Status: SHIPPED | OUTPATIENT
Start: 2022-12-19

## 2023-05-11 NOTE — TELEPHONE ENCOUNTER
Pt's dad called on behalf of the patient because at the pt's last appt with Dr Gardenia William the pt's Midodrine was increased from 5mg TID to 10mg TID. Caller stated that the script that was sent in was for Midodrine 10mg half tab TID, but that the pt had been taking the full 10mg TID as instructed at his ov on 10/13/17. Caller stated that the pt was unable to use the refills on the new script because it's too early per medication instructions so has been taking 2 5mg tabs TID but will run out on Monday. Please clarify instructions and send in a new script to [facility].   [phone] Hemostasis: [employer]roc[employer]

## 2023-06-02 ENCOUNTER — OFFICE VISIT (OUTPATIENT)
Dept: CARDIOLOGY | Age: 58
End: 2023-06-02
Payer: MEDICAID

## 2023-06-02 VITALS
HEIGHT: 72 IN | BODY MASS INDEX: 25.33 KG/M2 | SYSTOLIC BLOOD PRESSURE: 98 MMHG | WEIGHT: 187 LBS | RESPIRATION RATE: 17 BRPM | DIASTOLIC BLOOD PRESSURE: 63 MMHG | HEART RATE: 94 BPM | OXYGEN SATURATION: 94 %

## 2023-06-02 DIAGNOSIS — R42 DIZZINESS: Primary | ICD-10-CM

## 2023-06-02 DIAGNOSIS — I95.1 ORTHOSTATIC HYPOTENSION: ICD-10-CM

## 2023-06-02 PROCEDURE — 99213 OFFICE O/P EST LOW 20 MIN: CPT | Performed by: INTERNAL MEDICINE

## 2023-06-02 PROCEDURE — 99212 OFFICE O/P EST SF 10 MIN: CPT | Performed by: INTERNAL MEDICINE

## 2023-06-02 PROCEDURE — 93010 ELECTROCARDIOGRAM REPORT: CPT | Performed by: INTERNAL MEDICINE

## 2023-06-02 PROCEDURE — 93005 ELECTROCARDIOGRAM TRACING: CPT | Performed by: INTERNAL MEDICINE

## 2023-06-02 PROCEDURE — G8427 DOCREV CUR MEDS BY ELIG CLIN: HCPCS | Performed by: INTERNAL MEDICINE

## 2023-06-02 PROCEDURE — 3017F COLORECTAL CA SCREEN DOC REV: CPT | Performed by: INTERNAL MEDICINE

## 2023-06-02 PROCEDURE — G8419 CALC BMI OUT NRM PARAM NOF/U: HCPCS | Performed by: INTERNAL MEDICINE

## 2023-06-02 PROCEDURE — 1036F TOBACCO NON-USER: CPT | Performed by: INTERNAL MEDICINE

## 2023-06-02 NOTE — PROGRESS NOTES
Date:   6/2/2023  Patient name:  Nydia Gray  MRN:   [id number]  YOB: 1965  PCP:    BABAK Nguyen CNP    Reason for visit: had concerns including Hypotension and Dizziness. Subjective:       Clinical Changes / Abnormalities:Denies exertional chest pain or SOB, no dizziness or syncope. ROS:   General: No dizziness or syncope and no fever or chills, no fatigue. Chest: No cough or phlegm, no SOB  Heart: No chest pain, no SOB, no palpitations. GI: No abdominal pain, no nausea or vomiting. No diarrhea or constipation. Lower extremity: No claudication, no myalgia      MEDICAL HISTORY:         Medications:     Current Outpatient Medications   Medication Sig Dispense Refill    midodrine (PROAMATINE) 10 MG tablet Take 1 tablet by mouth 3 times daily 270 tablet 3    Ferrous Sulfate (IRON SUPPLEMENT PO) Take by mouth daily      Elastic Bandages & Supports (ANKLE SPLINT/NIGHT AIRFORM) MISC 1 Device by Does not apply route every evening Achilles tendinitis of both lower extremities  (primary encounter diagnosis)  Kelsy's deformity, bilateral  Equinus deformity    Dispense posterior night splint. 1 each 0    Cholecalciferol (VITAMIN D) 2000 units CAPS capsule Take 1 capsule by mouth daily      benztropine (COGENTIN) 2 MG tablet Take 1 tablet by mouth 3 times daily      fenofibrate (TRICOR) 145 MG tablet Take 160 mg by mouth daily       pravastatin (PRAVACHOL) 40 MG tablet Take 1 tablet by mouth daily      risperiDONE (RISPERDAL) 1 MG tablet Take 1 tablet by mouth daily       No current facility-administered medications for this visit. Objective:   Vitals: BP (!) 84/54   Pulse 94   Resp 17   Ht 6' (1.829 m)   Wt 187 lb (84.8 kg)   SpO2 94%   BMI 25.36 kg/m²   General appearance: alert and cooperative with exam  HEENT: Head: Normocephalic, no lesions, without obvious abnormality.   Neck: no adenopathy, no carotid bruit, no JVD, supple, symmetrical, trachea

## 2023-12-15 ENCOUNTER — OFFICE VISIT (OUTPATIENT)
Dept: CARDIOLOGY | Age: 58
End: 2023-12-15
Payer: MEDICAID

## 2023-12-15 VITALS
SYSTOLIC BLOOD PRESSURE: 130 MMHG | WEIGHT: 197 LBS | DIASTOLIC BLOOD PRESSURE: 78 MMHG | HEIGHT: 72 IN | HEART RATE: 79 BPM | BODY MASS INDEX: 26.68 KG/M2

## 2023-12-15 DIAGNOSIS — T73.3XXS FATIGUE DUE TO EXCESSIVE EXERTION, SEQUELA: ICD-10-CM

## 2023-12-15 DIAGNOSIS — R42 DIZZINESS: Primary | ICD-10-CM

## 2023-12-15 DIAGNOSIS — R06.02 SOB (SHORTNESS OF BREATH): ICD-10-CM

## 2023-12-15 PROCEDURE — 93005 ELECTROCARDIOGRAM TRACING: CPT | Performed by: INTERNAL MEDICINE

## 2023-12-15 PROCEDURE — 1036F TOBACCO NON-USER: CPT | Performed by: INTERNAL MEDICINE

## 2023-12-15 PROCEDURE — G8484 FLU IMMUNIZE NO ADMIN: HCPCS | Performed by: INTERNAL MEDICINE

## 2023-12-15 PROCEDURE — 93010 ELECTROCARDIOGRAM REPORT: CPT | Performed by: INTERNAL MEDICINE

## 2023-12-15 PROCEDURE — 99214 OFFICE O/P EST MOD 30 MIN: CPT | Performed by: INTERNAL MEDICINE

## 2023-12-15 PROCEDURE — 3017F COLORECTAL CA SCREEN DOC REV: CPT | Performed by: INTERNAL MEDICINE

## 2023-12-15 PROCEDURE — 99212 OFFICE O/P EST SF 10 MIN: CPT | Performed by: INTERNAL MEDICINE

## 2023-12-15 PROCEDURE — G8419 CALC BMI OUT NRM PARAM NOF/U: HCPCS | Performed by: INTERNAL MEDICINE

## 2023-12-15 PROCEDURE — G8427 DOCREV CUR MEDS BY ELIG CLIN: HCPCS | Performed by: INTERNAL MEDICINE

## 2024-01-04 DIAGNOSIS — R42 DIZZINESS: ICD-10-CM

## 2024-01-04 RX ORDER — MIDODRINE HYDROCHLORIDE 10 MG/1
10 TABLET ORAL 3 TIMES DAILY
Qty: 270 TABLET | Refills: 3 | Status: SHIPPED | OUTPATIENT
Start: 2024-01-04

## 2024-01-19 ENCOUNTER — OFFICE VISIT (OUTPATIENT)
Dept: CARDIOLOGY | Age: 59
End: 2024-01-19
Payer: MEDICAID

## 2024-01-19 VITALS
HEIGHT: 72 IN | OXYGEN SATURATION: 97 % | DIASTOLIC BLOOD PRESSURE: 78 MMHG | WEIGHT: 199 LBS | SYSTOLIC BLOOD PRESSURE: 136 MMHG | BODY MASS INDEX: 26.95 KG/M2 | HEART RATE: 101 BPM

## 2024-01-19 DIAGNOSIS — I95.1 ORTHOSTATIC HYPOTENSION: ICD-10-CM

## 2024-01-19 DIAGNOSIS — R94.39 POSITIVE CARDIAC STRESS TEST: Primary | ICD-10-CM

## 2024-01-19 PROCEDURE — 99212 OFFICE O/P EST SF 10 MIN: CPT | Performed by: INTERNAL MEDICINE

## 2024-01-19 PROCEDURE — G8419 CALC BMI OUT NRM PARAM NOF/U: HCPCS | Performed by: INTERNAL MEDICINE

## 2024-01-19 PROCEDURE — 1036F TOBACCO NON-USER: CPT | Performed by: INTERNAL MEDICINE

## 2024-01-19 PROCEDURE — G8427 DOCREV CUR MEDS BY ELIG CLIN: HCPCS | Performed by: INTERNAL MEDICINE

## 2024-01-19 PROCEDURE — 99214 OFFICE O/P EST MOD 30 MIN: CPT | Performed by: INTERNAL MEDICINE

## 2024-01-19 PROCEDURE — 3017F COLORECTAL CA SCREEN DOC REV: CPT | Performed by: INTERNAL MEDICINE

## 2024-01-19 PROCEDURE — G8484 FLU IMMUNIZE NO ADMIN: HCPCS | Performed by: INTERNAL MEDICINE

## 2024-06-21 ENCOUNTER — OFFICE VISIT (OUTPATIENT)
Dept: CARDIOLOGY | Age: 59
End: 2024-06-21
Payer: MEDICAID

## 2024-06-21 VITALS
WEIGHT: 198 LBS | DIASTOLIC BLOOD PRESSURE: 68 MMHG | HEIGHT: 72 IN | BODY MASS INDEX: 26.82 KG/M2 | SYSTOLIC BLOOD PRESSURE: 112 MMHG | HEART RATE: 72 BPM

## 2024-06-21 DIAGNOSIS — I95.1 ORTHOSTATIC HYPOTENSION: ICD-10-CM

## 2024-06-21 DIAGNOSIS — R07.9 CHEST PAIN, UNSPECIFIED TYPE: Primary | ICD-10-CM

## 2024-06-21 DIAGNOSIS — R94.39 POSITIVE CARDIAC STRESS TEST: ICD-10-CM

## 2024-06-21 PROCEDURE — G8427 DOCREV CUR MEDS BY ELIG CLIN: HCPCS | Performed by: INTERNAL MEDICINE

## 2024-06-21 PROCEDURE — 99214 OFFICE O/P EST MOD 30 MIN: CPT | Performed by: INTERNAL MEDICINE

## 2024-06-21 PROCEDURE — 1036F TOBACCO NON-USER: CPT | Performed by: INTERNAL MEDICINE

## 2024-06-21 PROCEDURE — 93005 ELECTROCARDIOGRAM TRACING: CPT | Performed by: INTERNAL MEDICINE

## 2024-06-21 PROCEDURE — 93010 ELECTROCARDIOGRAM REPORT: CPT | Performed by: INTERNAL MEDICINE

## 2024-06-21 PROCEDURE — 99213 OFFICE O/P EST LOW 20 MIN: CPT | Performed by: INTERNAL MEDICINE

## 2024-06-21 PROCEDURE — 3017F COLORECTAL CA SCREEN DOC REV: CPT | Performed by: INTERNAL MEDICINE

## 2024-06-21 PROCEDURE — G8419 CALC BMI OUT NRM PARAM NOF/U: HCPCS | Performed by: INTERNAL MEDICINE

## 2024-06-21 RX ORDER — RANOLAZINE 500 MG/1
500 TABLET, EXTENDED RELEASE ORAL 2 TIMES DAILY
Qty: 60 TABLET | Refills: 3 | Status: SHIPPED | OUTPATIENT
Start: 2024-06-21

## 2024-06-21 NOTE — PROGRESS NOTES
Date:   6/21/2024  Patient name:  Marco Beth  MRN:   [id number]  YOB: 1965  PCP:    Eula Mayers APRN - CNP    Reason for visit: had concerns including Chest Pain (Has had some episodes of chest aching for about 20 minutes ).    Subjective:       Clinical Changes / Abnormalities:Sometimes get chest pain and SOB with exertion, feels more tired. No dizziness or syncope and no palpitations.       ROS:   General: No dizziness or syncope and no fever or chills, no fatigue.   Chest: No cough or phlegm, yes SOB  Heart: yes chest pain, no SOB, no palpitations.   GI: No abdominal pain, no nausea or vomiting. No diarrhea or constipation.   Lower extremity: No claudication, no myalgia        Medications:     Current Outpatient Medications   Medication Sig Dispense Refill    midodrine (PROAMATINE) 10 MG tablet Take 1 tablet by mouth 3 times daily 270 tablet 3    Ferrous Sulfate (IRON SUPPLEMENT PO) Take by mouth daily      Elastic Bandages & Supports (ANKLE SPLINT/NIGHT AIRFORM) MISC 1 Device by Does not apply route every evening Achilles tendinitis of both lower extremities  (primary encounter diagnosis)  Kelsy's deformity, bilateral  Equinus deformity    Dispense posterior night splint. 1 each 0    Cholecalciferol (VITAMIN D) 2000 units CAPS capsule Take 1 capsule by mouth daily      benztropine (COGENTIN) 2 MG tablet Take 1 tablet by mouth 3 times daily      fenofibrate (TRICOR) 145 MG tablet Take 160 mg by mouth daily       pravastatin (PRAVACHOL) 40 MG tablet Take 1 tablet by mouth daily      risperiDONE (RISPERDAL) 1 MG tablet Take 1 tablet by mouth daily       No current facility-administered medications for this visit.          Objective:   Vitals: /68   Pulse 72   Ht 1.829 m (6')   Wt 89.8 kg (198 lb)   BMI 26.85 kg/m²   General appearance: alert and cooperative with exam  HEENT: Head: Normocephalic, no lesions, without obvious abnormality.  Neck: no adenopathy,

## 2024-07-05 ENCOUNTER — OFFICE VISIT (OUTPATIENT)
Dept: CARDIOLOGY | Age: 59
End: 2024-07-05
Payer: MEDICAID

## 2024-07-05 VITALS
BODY MASS INDEX: 26.85 KG/M2 | HEART RATE: 75 BPM | WEIGHT: 198 LBS | SYSTOLIC BLOOD PRESSURE: 122 MMHG | DIASTOLIC BLOOD PRESSURE: 66 MMHG | RESPIRATION RATE: 16 BRPM | OXYGEN SATURATION: 97 %

## 2024-07-05 DIAGNOSIS — R42 DIZZINESS: ICD-10-CM

## 2024-07-05 DIAGNOSIS — I15.0 RENOVASCULAR HYPERTENSION: ICD-10-CM

## 2024-07-05 DIAGNOSIS — R94.39 POSITIVE CARDIAC STRESS TEST: ICD-10-CM

## 2024-07-05 DIAGNOSIS — I95.1 ORTHOSTATIC HYPOTENSION: Primary | ICD-10-CM

## 2024-07-05 DIAGNOSIS — R06.02 SOB (SHORTNESS OF BREATH): ICD-10-CM

## 2024-07-05 PROCEDURE — 93005 ELECTROCARDIOGRAM TRACING: CPT | Performed by: INTERNAL MEDICINE

## 2024-07-05 PROCEDURE — 93010 ELECTROCARDIOGRAM REPORT: CPT | Performed by: INTERNAL MEDICINE

## 2024-07-05 PROCEDURE — G8427 DOCREV CUR MEDS BY ELIG CLIN: HCPCS | Performed by: INTERNAL MEDICINE

## 2024-07-05 PROCEDURE — G8419 CALC BMI OUT NRM PARAM NOF/U: HCPCS | Performed by: INTERNAL MEDICINE

## 2024-07-05 PROCEDURE — 1036F TOBACCO NON-USER: CPT | Performed by: INTERNAL MEDICINE

## 2024-07-05 PROCEDURE — 99212 OFFICE O/P EST SF 10 MIN: CPT | Performed by: INTERNAL MEDICINE

## 2024-07-05 PROCEDURE — 99214 OFFICE O/P EST MOD 30 MIN: CPT | Performed by: INTERNAL MEDICINE

## 2024-07-05 PROCEDURE — 3017F COLORECTAL CA SCREEN DOC REV: CPT | Performed by: INTERNAL MEDICINE

## 2024-07-05 RX ORDER — RISPERIDONE 4 MG/1
4 TABLET ORAL DAILY
COMMUNITY
Start: 2024-06-19

## 2024-07-05 RX ORDER — FENOFIBRATE 160 MG/1
160 TABLET ORAL DAILY
COMMUNITY
Start: 2024-07-03

## 2024-07-05 NOTE — PROGRESS NOTES
by mouth daily   Yes Mayra Tesfaye MD   benztropine (COGENTIN) 2 MG tablet Take 1 tablet by mouth 3 times daily   Yes Mayra Tesfaye MD   pravastatin (PRAVACHOL) 40 MG tablet Take 1 tablet by mouth daily   Yes Mayra Tesfaye MD   fenofibrate (TRICOR) 145 MG tablet Take 160 mg by mouth daily   Patient not taking: Reported on 7/5/2024    Mayra Tesfaye MD   risperiDONE (RISPERDAL) 1 MG tablet Take 1 tablet by mouth daily  Patient not taking: Reported on 7/5/2024    Mayra Tesfaye MD       Allergies:  Ibuprofen and Pcn [penicillins]    Social History:   reports that he has never smoked. He has never used smokeless tobacco. He reports that he does not drink alcohol and does not use drugs.    Review of Systems:  Constitutional: there has been no unanticipated weight loss. There's been No change in energy level, No change in activity level.     Eyes: No visual changes or diplopia. No scleral icterus.  ENT: No Headaches, hearing loss or vertigo. No mouth sores or sore throat.  Cardiovascular: As above.  Respiratory: No SOB, cough or hemoptysis.  Gastrointestinal: No abdominal pain, appetite loss, blood in stools. No change in bowel or bladder habits.  Genitourinary: No dysuria, trouble voiding, or hematuria.  Musculoskeletal:  No gait disturbance, No weakness or joint complaints.  Integumentary: No rash or pruritis.  Psychiatric: No anxiety, or depression.  Hematologic/Lymphatic: No abnormal bruising or bleeding, blood clots or swollen lymph nodes.  Allergic/Immunologic: No nasal congestion or hives.    Physical Exam:  /66 (Site: Left Upper Arm, Position: Sitting, Cuff Size: Large Adult)   Pulse 75   Resp 16   Wt 89.8 kg (198 lb)   SpO2 97%   BMI 26.85 kg/m²     Constitutional and General Appearance: alert, cooperative, no distress and appears stated age  HEENT: PERRL, no cervical lymphadenopathy. No masses palpable. Normal oral mucosa  Respiratory:  Normal excursion and  Deep Sutures: 2-0 Polysorb

## 2024-07-15 ENCOUNTER — TELEPHONE (OUTPATIENT)
Dept: CARDIOLOGY | Age: 59
End: 2024-07-15

## 2024-07-15 NOTE — TELEPHONE ENCOUNTER
Pt called stating he was recently given ranexa and he is having a lot of myalgia and constipation. Pt asking if he can reduce to 1 tab daily instead of BID    [phone]    Last Appt:  7/5/2024  Next Appt:   1/10/2025  Med verified in Epic

## 2024-07-18 NOTE — TELEPHONE ENCOUNTER
Patient notified of ok to take Ranexa once daily per Dr Zhong. Pt was advised to call office if symptoms persist or worsen after taking only daily. Pt verbalized understanding and had no further questions at the time.

## 2024-09-13 ENCOUNTER — TELEPHONE (OUTPATIENT)
Dept: CARDIOLOGY | Age: 59
End: 2024-09-13

## 2024-10-23 ENCOUNTER — OFFICE VISIT (OUTPATIENT)
Dept: CARDIOLOGY | Age: 59
End: 2024-10-23
Payer: MEDICAID

## 2024-10-23 VITALS
WEIGHT: 188 LBS | BODY MASS INDEX: 25.47 KG/M2 | DIASTOLIC BLOOD PRESSURE: 68 MMHG | HEART RATE: 102 BPM | HEIGHT: 72 IN | SYSTOLIC BLOOD PRESSURE: 94 MMHG

## 2024-10-23 DIAGNOSIS — R07.9 CHEST PAIN, UNSPECIFIED TYPE: ICD-10-CM

## 2024-10-23 DIAGNOSIS — R94.39 POSITIVE CARDIAC STRESS TEST: ICD-10-CM

## 2024-10-23 DIAGNOSIS — I95.1 ORTHOSTATIC HYPOTENSION: Primary | ICD-10-CM

## 2024-10-23 DIAGNOSIS — E78.5 DYSLIPIDEMIA: ICD-10-CM

## 2024-10-23 PROCEDURE — 99213 OFFICE O/P EST LOW 20 MIN: CPT | Performed by: NURSE PRACTITIONER

## 2024-10-23 PROCEDURE — 3017F COLORECTAL CA SCREEN DOC REV: CPT | Performed by: NURSE PRACTITIONER

## 2024-10-23 PROCEDURE — G8484 FLU IMMUNIZE NO ADMIN: HCPCS | Performed by: NURSE PRACTITIONER

## 2024-10-23 PROCEDURE — G8419 CALC BMI OUT NRM PARAM NOF/U: HCPCS | Performed by: NURSE PRACTITIONER

## 2024-10-23 PROCEDURE — G8427 DOCREV CUR MEDS BY ELIG CLIN: HCPCS | Performed by: NURSE PRACTITIONER

## 2024-10-23 PROCEDURE — 1036F TOBACCO NON-USER: CPT | Performed by: NURSE PRACTITIONER

## 2024-10-23 PROCEDURE — 99214 OFFICE O/P EST MOD 30 MIN: CPT | Performed by: NURSE PRACTITIONER

## 2024-10-23 NOTE — PROGRESS NOTES
[facility] in [place], [place], not in system, but they are open.  [phone] is the phone.  
wall motion. Normal diastolic function.    Image quality is technically difficult. Therefore walls were not well visualized, can not comment on wall motion analysis.    STRESS 12/15/23:     Stress Combined Conclusion: The study is positive for myocardial ischemia and is negative for myocardial infarction.    Perfusion Defect: Can not rule out mild intensity, small sized apical reversible ischemia.  However this is all limited due to significant GI uptake.    Stress Function: Left ventricular function post-stress is normal. Post-stress ejection fraction is 66%.    Perfusion Comments: Prone images were obtained. LV perfusion is probably abnormal. There is evidence of inducible ischemia.    Perfusion Conclusion: TID ratio is 0.98.    ECG: Resting ECG demonstrates normal sinus rhythm.    ECG: The ECG was negative for ischemia.    Stress Test: A pharmacological stress test was performed using lexiscan. The patient reported chest pain during the stress test.    Stress ECG: There were no arrhythmias during stress. Non-specific ST abnormality noted. The ECG was negative for ischemia.    CATH: NA    Past Medical and Surgical History, Problem List, Allergies, Medications, Labs, Imaging, all reviewed extensively in EMR and with the patient.    Assessment:  There are no problems to display for this patient.        Plan:    Orthostatic hypotension-  Stable. Continue midodrine.  Discussed importance of ambulatory blood pressure monitoring and medication compliance. Patient verbalized understanding to write down BP's and bring to next appointment.     Chest pain worse with certain movements-with an abnormal stress test- Continue statin. Patient reports that the Ranexa made his chest pains worse. Does not want to proceed with cath due to advanced kidney disease- CKD stage IV. Patient follows with nephrology.     Hyperlipidemia- Continue Statin therapy. Follow-up with regular Lipid/AST/ALT    The patient is to continue heart healthy

## 2024-11-22 DIAGNOSIS — R42 DIZZINESS: ICD-10-CM

## 2024-11-22 RX ORDER — MIDODRINE HYDROCHLORIDE 10 MG/1
10 TABLET ORAL 3 TIMES DAILY
Qty: 180 TABLET | Refills: 3 | Status: SHIPPED | OUTPATIENT
Start: 2024-11-22

## 2025-01-10 ENCOUNTER — OFFICE VISIT (OUTPATIENT)
Dept: CARDIOLOGY | Age: 60
End: 2025-01-10
Payer: MEDICAID

## 2025-01-10 VITALS
BODY MASS INDEX: 25.87 KG/M2 | WEIGHT: 191 LBS | DIASTOLIC BLOOD PRESSURE: 72 MMHG | SYSTOLIC BLOOD PRESSURE: 104 MMHG | HEIGHT: 72 IN | HEART RATE: 77 BPM

## 2025-01-10 DIAGNOSIS — E78.5 DYSLIPIDEMIA: ICD-10-CM

## 2025-01-10 DIAGNOSIS — R42 DIZZINESS: Primary | ICD-10-CM

## 2025-01-10 DIAGNOSIS — I95.1 ORTHOSTATIC HYPOTENSION: ICD-10-CM

## 2025-01-10 PROCEDURE — 99212 OFFICE O/P EST SF 10 MIN: CPT | Performed by: INTERNAL MEDICINE

## 2025-01-10 PROCEDURE — 1036F TOBACCO NON-USER: CPT | Performed by: INTERNAL MEDICINE

## 2025-01-10 PROCEDURE — 3017F COLORECTAL CA SCREEN DOC REV: CPT | Performed by: INTERNAL MEDICINE

## 2025-01-10 PROCEDURE — G8427 DOCREV CUR MEDS BY ELIG CLIN: HCPCS | Performed by: INTERNAL MEDICINE

## 2025-01-10 PROCEDURE — 93010 ELECTROCARDIOGRAM REPORT: CPT | Performed by: INTERNAL MEDICINE

## 2025-01-10 PROCEDURE — 93005 ELECTROCARDIOGRAM TRACING: CPT | Performed by: INTERNAL MEDICINE

## 2025-01-10 PROCEDURE — G8419 CALC BMI OUT NRM PARAM NOF/U: HCPCS | Performed by: INTERNAL MEDICINE

## 2025-01-10 PROCEDURE — 99214 OFFICE O/P EST MOD 30 MIN: CPT | Performed by: INTERNAL MEDICINE

## 2025-01-10 NOTE — PROGRESS NOTES
deformity    Dispense posterior night splint. 9/3/20   [place], DPM       Allergies:  Ibuprofen and Pcn [penicillins]    Social History:   reports that he has never smoked. He has never been exposed to tobacco smoke. He has never used smokeless tobacco. He reports that he does not drink alcohol and does not use drugs.     Family History: family history includes Cancer in his maternal grandfather; Diabetes in his maternal aunt and maternal grandmother; High Blood Pressure in his mother. No h/o sudden cardiac death.No for premature CAD    REVIEW OF SYSTEMS:    Constitutional: there has been no unanticipated weight loss. There's been No change in energy level, No change in activity level.     Eyes: No visual changes or diplopia. No scleral icterus.  ENT: No Headaches, hearing loss or vertigo. No mouth sores or sore throat.  Cardiovascular: see above  Respiratory: see above  Gastrointestinal: No abdominal pain, appetite loss, blood in stools.   Genitourinary: No dysuria, trouble voiding, or hematuria.  Musculoskeletal:  No gait disturbance, No weakness or joint complaints.  Integumentary: No rash or pruritis.  Neurological: No headache or diplopia. No tingling  Psychiatric: No anxiety, or depression.  Endocrine: No temperature intolerance.   Hematologic/Lymphatic: No abnormal bruising or bleeding, blood clots or swollen lymph nodes.  Allergic/Immunologic: No nasal congestion or hives.      PHYSICAL EXAM:      /72 (Site: Left Upper Arm, Position: Sitting)   Ht 1.829 m (6')   Wt 86.6 kg (191 lb)   BMI 25.90 kg/m²    Constitutional and General Appearance: alert, cooperative, no distress and appears stated age  HEENT: PERRL, no cervical lymphadenopathy. No masses palpable. Normal oral mucosa  Respiratory:  Normal excursion and expansion without use of accessory muscles  Resp Auscultation: Good respiratory effort. No for increased work of breathing. On auscultation: clear to auscultation

## 2025-06-24 ENCOUNTER — TELEPHONE (OUTPATIENT)
Dept: SURGERY | Age: 60
End: 2025-06-24

## 2025-06-26 DIAGNOSIS — A04.72 C. DIFFICILE DIARRHEA: ICD-10-CM

## 2025-06-26 DIAGNOSIS — E78.5 HYPERLIPIDEMIA, UNSPECIFIED HYPERLIPIDEMIA TYPE: ICD-10-CM

## 2025-06-26 DIAGNOSIS — E05.90 HYPERTHYROIDISM: ICD-10-CM

## 2025-06-26 DIAGNOSIS — I95.9 HYPOTENSION, UNSPECIFIED HYPOTENSION TYPE: ICD-10-CM

## 2025-06-26 DIAGNOSIS — F20.9 SCHIZOPHRENIA, UNSPECIFIED TYPE (HCC): Primary | ICD-10-CM

## 2025-06-26 DIAGNOSIS — R42 DIZZINESS: ICD-10-CM

## 2025-06-26 DIAGNOSIS — I10 ESSENTIAL HYPERTENSION: ICD-10-CM

## 2025-06-26 PROBLEM — N18.4 CHRONIC KIDNEY DISEASE, STAGE 4 (SEVERE) (HCC): Chronic | Status: ACTIVE | Noted: 2024-05-07

## 2025-06-26 PROBLEM — E78.2 MIXED HYPERLIPIDEMIA: Status: ACTIVE | Noted: 2017-07-19

## 2025-06-26 PROBLEM — N18.32 STAGE 3B CHRONIC KIDNEY DISEASE (HCC): Status: ACTIVE | Noted: 2017-07-19

## 2025-06-26 PROBLEM — F20.0 PARANOID SCHIZOPHRENIA (HCC): Status: ACTIVE | Noted: 2017-07-19

## 2025-06-26 PROBLEM — F80.1 EXPRESSIVE LANGUAGE DISORDER: Status: ACTIVE | Noted: 2021-12-28

## 2025-07-09 ENCOUNTER — TELEPHONE (OUTPATIENT)
Dept: SURGERY | Age: 60
End: 2025-07-09

## 2025-07-09 NOTE — TELEPHONE ENCOUNTER
H &P Colonoscopy  Patient:Marco Beth                 :1965  (yes) patient known to Dr. Whitehead's practice  PCP: Eula Mayers    CC:History of colon polyps        HPI:    ROS:  All 12 systems negative except the positives in the HPI.     Colonoscopy  Abd pain: no  Anemia: no  Bloating:no  Diarrhea: no  Constipation: no  Melena: no  Hematochezia:no  Rectal Bleeding:no  Rectal/Anal Pain:no  Pruritus: no  Family history colon Cancer: no  Previous colon cancer: no  Previous Colon Polyp: yes   Change in bowels: no  Decrease caliber of stool: no  Change in color of stool: no    Previous work up date: 2019 Colonoscopy by Dr Maloney at [facility] in [place] with findings of submucosal lipoma       Family History   Problem Relation Age of Onset    High Blood Pressure Mother     Diabetes Maternal Aunt     Diabetes Maternal Grandmother     Cancer Maternal Grandfather         lung cancer     Social History     Socioeconomic History    Marital status: Single     Spouse name: Not on file    Number of children: Not on file    Years of education: Not on file    Highest education level: Not on file   Occupational History    Not on file   Tobacco Use    Smoking status: Never     Passive exposure: Never    Smokeless tobacco: Never   Vaping Use    Vaping status: Never Used   Substance and Sexual Activity    Alcohol use: No    Drug use: No    Sexual activity: Not on file   Other Topics Concern    Not on file   Social History Narrative    Not on file     Social Drivers of Health     Financial Resource Strain: Not on file   Food Insecurity: Not on file   Transportation Needs: Not on file   Physical Activity: Not on file   Stress: Not on file   Social Connections: Not on file   Intimate Partner Violence: Not on file   Housing Stability: Not on file     Past Surgical History:   Procedure Laterality Date    COLONOSCOPY  2019    submucosal lipoma by Dr Maloney    THROAT SURGERY  1984    Dr. [facility] double

## 2025-07-09 NOTE — TELEPHONE ENCOUNTER
Instructions reviewed over the phone and mailed to the patient. All questions answered and patient denies any further questions at this time. Patient scheduled for colonoscopy on 8-8-25 at [facility] with Dr. Whitehead. Instructed patient he can purchase the Miralax/Gatorade bowel prep over the counter at his pharmacy.

## 2025-07-16 ENCOUNTER — OFFICE VISIT (OUTPATIENT)
Dept: CARDIOLOGY | Age: 60
End: 2025-07-16
Payer: MEDICAID

## 2025-07-16 VITALS
DIASTOLIC BLOOD PRESSURE: 60 MMHG | BODY MASS INDEX: 25.87 KG/M2 | SYSTOLIC BLOOD PRESSURE: 90 MMHG | WEIGHT: 191 LBS | HEART RATE: 86 BPM | HEIGHT: 72 IN | OXYGEN SATURATION: 97 %

## 2025-07-16 DIAGNOSIS — R07.9 CHEST PAIN, UNSPECIFIED TYPE: ICD-10-CM

## 2025-07-16 DIAGNOSIS — I95.1 ORTHOSTATIC HYPOTENSION: Primary | ICD-10-CM

## 2025-07-16 DIAGNOSIS — E78.5 DYSLIPIDEMIA: ICD-10-CM

## 2025-07-16 PROCEDURE — 93010 ELECTROCARDIOGRAM REPORT: CPT | Performed by: NURSE PRACTITIONER

## 2025-07-16 PROCEDURE — 3017F COLORECTAL CA SCREEN DOC REV: CPT | Performed by: NURSE PRACTITIONER

## 2025-07-16 PROCEDURE — 3078F DIAST BP <80 MM HG: CPT | Performed by: NURSE PRACTITIONER

## 2025-07-16 PROCEDURE — G8419 CALC BMI OUT NRM PARAM NOF/U: HCPCS | Performed by: NURSE PRACTITIONER

## 2025-07-16 PROCEDURE — 99212 OFFICE O/P EST SF 10 MIN: CPT | Performed by: NURSE PRACTITIONER

## 2025-07-16 PROCEDURE — 99214 OFFICE O/P EST MOD 30 MIN: CPT | Performed by: NURSE PRACTITIONER

## 2025-07-16 PROCEDURE — 3074F SYST BP LT 130 MM HG: CPT | Performed by: NURSE PRACTITIONER

## 2025-07-16 PROCEDURE — 93005 ELECTROCARDIOGRAM TRACING: CPT | Performed by: NURSE PRACTITIONER

## 2025-07-16 PROCEDURE — 1036F TOBACCO NON-USER: CPT | Performed by: NURSE PRACTITIONER

## 2025-07-16 PROCEDURE — G8427 DOCREV CUR MEDS BY ELIG CLIN: HCPCS | Performed by: NURSE PRACTITIONER

## 2025-07-16 NOTE — PROGRESS NOTES
Refills: - No refills needed at the moment. Plan: - Follow up in 6 months or sooner if any issues with medications arise.           Plan:    Orthostatic hypotension-  Stable. Continue midodrine.  Discussed importance of ambulatory blood pressure monitoring and medication compliance. Patient verbalized understanding to write down BP's and bring to next appointment.     No further episodes of chest pains.    Hyperlipidemia- Continue Statin therapy. Follow-up with regular Lipid/AST/ALT    The patient is to continue heart healthy diet, weight loss and exercise as tolerated. Patient's medications and side effects were discussed. Medication refills were provided if needed. Follow up appointment timing was discussed. All questions and concerns were addressed to patient's satisfaction.     The patient is to follow up in 6 months or sooner if necessary.    Thank you for allowing me to participate in the care of this patient, please do not hesitate to call if you have any questions.    Cammy Rodas, APRN - CNP   [phone]    This note was created with the assistance of a speech-recognition program.  Although the intention is to generate a document that actually reflects the content of the visit, no guarantees can be provided that every mistake has been identified and corrected by editing.

## 2025-07-25 DIAGNOSIS — R42 DIZZINESS: ICD-10-CM

## 2025-07-25 RX ORDER — MIDODRINE HYDROCHLORIDE 10 MG/1
10 TABLET ORAL 3 TIMES DAILY
Qty: 270 TABLET | Refills: 3 | Status: SHIPPED | OUTPATIENT
Start: 2025-07-25

## 2025-08-15 ENCOUNTER — TELEPHONE (OUTPATIENT)
Dept: SURGERY | Age: 60
End: 2025-08-15